# Patient Record
Sex: FEMALE | Race: BLACK OR AFRICAN AMERICAN | Employment: FULL TIME | URBAN - METROPOLITAN AREA
[De-identification: names, ages, dates, MRNs, and addresses within clinical notes are randomized per-mention and may not be internally consistent; named-entity substitution may affect disease eponyms.]

---

## 2017-01-07 ENCOUNTER — OFFICE VISIT (OUTPATIENT)
Dept: FAMILY MEDICINE CLINIC | Facility: CLINIC | Age: 57
End: 2017-01-07

## 2017-01-07 VITALS
WEIGHT: 151 LBS | HEART RATE: 72 BPM | HEIGHT: 63 IN | BODY MASS INDEX: 26.75 KG/M2 | SYSTOLIC BLOOD PRESSURE: 136 MMHG | DIASTOLIC BLOOD PRESSURE: 80 MMHG | TEMPERATURE: 98 F

## 2017-01-07 DIAGNOSIS — I10 ESSENTIAL HYPERTENSION: Primary | ICD-10-CM

## 2017-01-07 PROCEDURE — 99213 OFFICE O/P EST LOW 20 MIN: CPT | Performed by: FAMILY MEDICINE

## 2017-01-07 PROCEDURE — 99212 OFFICE O/P EST SF 10 MIN: CPT | Performed by: FAMILY MEDICINE

## 2017-01-07 NOTE — PROGRESS NOTES
HPI:    Patient ID: Jay Ward is a 55year old female. Blood Pressure  This is a chronic problem. The current episode started more than 1 year ago. The problem has been gradually improving. Associated symptoms include neck pain.  Pertinent negativ 5/2017. Also discussed importance of regular exercise and maintaining healthy diet with weight loss. Patient has recommended regular exercise. No orders of the defined types were placed in this encounter.        Meds This Visit:  No prescriptions reque

## 2017-03-24 ENCOUNTER — TELEPHONE (OUTPATIENT)
Dept: FAMILY MEDICINE CLINIC | Facility: CLINIC | Age: 57
End: 2017-03-24

## 2017-03-24 RX ORDER — VALACYCLOVIR HYDROCHLORIDE 1 G/1
1 TABLET, FILM COATED ORAL DAILY
Qty: 21 TABLET | Refills: 0 | Status: CANCELLED | OUTPATIENT
Start: 2017-03-24

## 2017-03-24 RX ORDER — VALACYCLOVIR HYDROCHLORIDE 1 G/1
2 TABLET, FILM COATED ORAL EVERY 12 HOURS SCHEDULED
Qty: 4 TABLET | Refills: 5 | Status: SHIPPED | OUTPATIENT
Start: 2017-03-24 | End: 2017-03-25

## 2017-03-24 NOTE — TELEPHONE ENCOUNTER
Actions Requested: Possible Rx to pharmacy  Valacyclovir Rx pended for MD approval.  Situation/Background   Problem: fever blister lower lip   Onset: over 24 hours ago   Associated Symptoms: none   History of Same: Pt states that she has Hx of fever bliste

## 2017-03-24 NOTE — TELEPHONE ENCOUNTER
Pt states she has a fever blister, states fever blister is on lip. Pt states normally  prescribes her Valtrex, would like to know if MD can prescribed it again.

## 2017-03-24 NOTE — TELEPHONE ENCOUNTER
Patient was left a detail message on personal voicemail. CSS if she calls back; ok to tell her her RX was sent for Valtrex with refills.

## 2017-07-29 ENCOUNTER — TELEPHONE (OUTPATIENT)
Dept: FAMILY MEDICINE CLINIC | Facility: CLINIC | Age: 57
End: 2017-07-29

## 2017-07-29 NOTE — TELEPHONE ENCOUNTER
Pt. Wants to know if it is ok for her to take [facility] for Menopause symptoms and hot flashes. Pt. States that she purchased it at [facility] [employer]. She states that she has not had a cycle since October 2016, only spotting on and off.  Pt. Wants to make sure

## 2017-07-31 NOTE — TELEPHONE ENCOUNTER
Pt returned call, reviewed doctor's recommendations as noted below. Pt agreed with plan of care and had no further questions at this time.

## 2017-08-01 ENCOUNTER — TELEPHONE (OUTPATIENT)
Dept: FAMILY MEDICINE CLINIC | Facility: CLINIC | Age: 57
End: 2017-08-01

## 2017-08-01 RX ORDER — METOPROLOL TARTRATE 50 MG/1
50 TABLET, FILM COATED ORAL DAILY
Qty: 30 TABLET | Refills: 1 | Status: SHIPPED | OUTPATIENT
Start: 2017-08-01 | End: 2017-09-01 | Stop reason: ALTCHOICE

## 2017-08-01 NOTE — TELEPHONE ENCOUNTER
Actions Requested: pt thinks she is having a side effect from Amlodipine, would like another med prescribed.   Problem: swollen gums  Onset and Timing: started in March of this year  Associated Symptoms: gums are tender to touch and occasionally bleed  Aggr

## 2017-08-01 NOTE — TELEPHONE ENCOUNTER
Advised patient of Dr. Fredo Laboy note. Patient verbalized understanding and is in agreement to start the metoprolol. Rx sent to pharmacy.

## 2017-08-01 NOTE — TELEPHONE ENCOUNTER
Please let patient know I recommend stopping amlodipine and starting metoprolol 50 mg daily. If patient in agreement send Rx for 1 month with 1 refill.   Try to check blood pressures outside of office and bring to appointment scheduled in September

## 2017-08-01 NOTE — TELEPHONE ENCOUNTER
Pt states she is having a side effect where her gums are swollen, sensitive to touch, and bleeding caused by the medication below, Pt would like another medication.  Pt states have already seen a dentist.     Current Outpatient Prescriptions:   •  AmLODIPin

## 2017-08-05 ENCOUNTER — TELEPHONE (OUTPATIENT)
Dept: FAMILY MEDICINE CLINIC | Facility: CLINIC | Age: 57
End: 2017-08-05

## 2017-08-05 NOTE — TELEPHONE ENCOUNTER
Pt states she may be having an allergic reaction to the medication below due to after she started taking the medication she started getting cramps in both feet. Pt would like to know if she should continue medication.     Current Outpatient Prescriptions

## 2017-08-06 ENCOUNTER — TELEPHONE (OUTPATIENT)
Dept: FAMILY MEDICINE CLINIC | Facility: CLINIC | Age: 57
End: 2017-08-06

## 2017-08-06 RX ORDER — METOPROLOL SUCCINATE 25 MG/1
25 TABLET, EXTENDED RELEASE ORAL DAILY
Qty: 30 TABLET | Refills: 1 | Status: SHIPPED | OUTPATIENT
Start: 2017-08-06 | End: 2017-10-01

## 2017-08-06 RX ORDER — LISINOPRIL 40 MG/1
40 TABLET ORAL DAILY
Qty: 30 TABLET | Refills: 1 | Status: SHIPPED | OUTPATIENT
Start: 2017-08-06 | End: 2017-09-01 | Stop reason: ALTCHOICE

## 2017-08-06 NOTE — TELEPHONE ENCOUNTER
On-call page–patient called because blood pressure elevated. She was on amlodipine but discontinued due to gingivitis. Started metoprolol 50 mg daily and had muscle cramps.   Plan to decrease metoprolol XL  To 25 mg daily, increase lisinopril to 40 mg abby

## 2017-08-09 NOTE — TELEPHONE ENCOUNTER
Was addressed by Dr. Kilo Odonnell  at 8/6/17. She stated her blood pressure has been ranging around 137/83. She is asking if the Cohosh that she is taking for her menopause can cause her blood pressure to rise.    The morning cohosh has caffeine and green te

## 2017-08-09 NOTE — TELEPHONE ENCOUNTER
Please let her know I do not think cohash or caffiene(less than 200 mg) is a likely cause of higher BPs.

## 2017-09-01 ENCOUNTER — OFFICE VISIT (OUTPATIENT)
Dept: FAMILY MEDICINE CLINIC | Facility: CLINIC | Age: 57
End: 2017-09-01

## 2017-09-01 VITALS
BODY MASS INDEX: 27.11 KG/M2 | WEIGHT: 153 LBS | TEMPERATURE: 98 F | RESPIRATION RATE: 16 BRPM | HEIGHT: 63 IN | SYSTOLIC BLOOD PRESSURE: 144 MMHG | HEART RATE: 76 BPM | DIASTOLIC BLOOD PRESSURE: 79 MMHG

## 2017-09-01 DIAGNOSIS — F43.9 STRESS: ICD-10-CM

## 2017-09-01 DIAGNOSIS — I10 ESSENTIAL HYPERTENSION: Primary | ICD-10-CM

## 2017-09-01 DIAGNOSIS — N95.1 MENOPAUSAL SYMPTOMS: ICD-10-CM

## 2017-09-01 DIAGNOSIS — H04.123 DRY EYES: ICD-10-CM

## 2017-09-01 PROCEDURE — 99212 OFFICE O/P EST SF 10 MIN: CPT | Performed by: FAMILY MEDICINE

## 2017-09-01 PROCEDURE — 99214 OFFICE O/P EST MOD 30 MIN: CPT | Performed by: FAMILY MEDICINE

## 2017-09-01 RX ORDER — SPIRONOLACTONE 50 MG/1
50 TABLET, FILM COATED ORAL DAILY
Qty: 90 TABLET | Refills: 0 | Status: SHIPPED | OUTPATIENT
Start: 2017-09-01 | End: 2017-11-21

## 2017-09-01 NOTE — PROGRESS NOTES
HPI:    Patient ID: Malcolm Chino is a 55year old female. Hypertension   This is a chronic problem. The current episode started more than 1 year ago. The problem has been gradually worsening since onset. The problem is uncontrolled.  Pertinent negati Blood pressure not controlled. She had to discontinue calcium channel blocker due to gingival side effects. Hypokalemia with hydrochlorothiazide. Reviewed last GFR and potassium, normal.  Plan to discontinue lisinopril, start spironolactone 50 mg daily.

## 2017-09-05 ENCOUNTER — NURSE TRIAGE (OUTPATIENT)
Dept: FAMILY MEDICINE CLINIC | Facility: CLINIC | Age: 57
End: 2017-09-05

## 2017-09-05 RX ORDER — LORAZEPAM 0.5 MG/1
0.5 TABLET ORAL EVERY 6 HOURS PRN
Qty: 20 TABLET | Refills: 0 | Status: SHIPPED | OUTPATIENT
Start: 2017-09-05 | End: 2017-10-07 | Stop reason: ALTCHOICE

## 2017-09-05 NOTE — TELEPHONE ENCOUNTER
Action Requested: Summary for Provider     []  Critical Lab, Recommendations Needed  [] Need Additional Advice  []   FYI    []   Need Orders  [x] Need Medications Sent to Pharmacy  []  Other     SUMMARY: WOULD LIKE MED TO HELP WITH ANXIETY  Pt saw Dr Rob Ervin

## 2017-09-05 NOTE — TELEPHONE ENCOUNTER
Please let her know I am sorry to hear about her sister. I have sent prescription to the pharmacy for lorazepam every 6 hours as needed for anxiety. But it can cause sedation, do not drive, do not drink alcohol with this.   This to use mostly at night to

## 2017-09-05 NOTE — TELEPHONE ENCOUNTER
Spoke with patient (name and  verified), reviewed information, patient verbalized understanding and agrees with plan.   [facility]

## 2017-09-23 ENCOUNTER — NURSE TRIAGE (OUTPATIENT)
Dept: OTHER | Age: 57
End: 2017-09-23

## 2017-09-23 NOTE — TELEPHONE ENCOUNTER
Patient indicated that started taking the spironolactone in the morning that Dr Astrid Arnold prescribed. Patient does not eat breakfast. Since started taking the medication has been dizzy and nauseated, but goes away after eating.  Patient stated that will try ta

## 2017-09-25 NOTE — TELEPHONE ENCOUNTER
FU Call: Pt stated tolerating spironolactone better after her meal. Pt has f/u appt 10/7/17 @ 10:45am with  [facility]. FYI:  [facility].

## 2017-09-26 ENCOUNTER — TELEPHONE (OUTPATIENT)
Dept: OTHER | Age: 57
End: 2017-09-26

## 2017-09-26 NOTE — TELEPHONE ENCOUNTER
TO DR NEVILLE;      Patient called and states that the medication Spironolactone is working good for her BP but she's nauseated when taking it on empty stomach and experiencing headache when taking on full stomach.  Patient is asking if the dose is too much for

## 2017-09-26 NOTE — TELEPHONE ENCOUNTER
Probably not but will discuss at follow-up. please let her know I recommend continuing to take blood pressure medication daily with small amount of food in the evening about 1 hour before bed. If headache okay to take Tylenol.   Bring record of home blood

## 2017-09-27 NOTE — TELEPHONE ENCOUNTER
Patient called and advised about Dr Kurt Stuart note. Patient verbalized understanding. Note      Probably not but will discuss at follow-up.  please let her know I recommend continuing to take blood pressure medication daily with small amount of food in th

## 2017-09-27 NOTE — TELEPHONE ENCOUNTER
LMTCB, please transfer to Triage RN, please don't take a message, but send her through directly to RN. Thank you.

## 2017-10-03 ENCOUNTER — TELEPHONE (OUTPATIENT)
Dept: FAMILY MEDICINE CLINIC | Facility: CLINIC | Age: 57
End: 2017-10-03

## 2017-10-03 RX ORDER — METOPROLOL SUCCINATE 25 MG/1
25 TABLET, EXTENDED RELEASE ORAL DAILY
Qty: 30 TABLET | Refills: 0 | Status: SHIPPED | OUTPATIENT
Start: 2017-10-03 | End: 2017-10-04

## 2017-10-03 NOTE — TELEPHONE ENCOUNTER
Per [facility], pt's insurance requires a 90 day supply of the Metoprolol Succ ER 25mg tab. Pls send in a new Rx for 90 days.

## 2017-10-04 RX ORDER — METOPROLOL SUCCINATE 25 MG/1
25 TABLET, EXTENDED RELEASE ORAL DAILY
Qty: 90 TABLET | Refills: 0 | Status: SHIPPED | OUTPATIENT
Start: 2017-10-04 | End: 2017-12-31

## 2017-10-04 NOTE — TELEPHONE ENCOUNTER
Pt has a f/u appt on 10/7  Approved per protocol. Thanks    Hypertensive Medications  Protocol Criteria:  · Appointment scheduled in the past 6 months or in the next 3 months  · BMP or CMP in the past 12 months  · Creatinine result < 2  Recent Outpatient

## 2017-10-07 ENCOUNTER — APPOINTMENT (OUTPATIENT)
Dept: LAB | Age: 57
End: 2017-10-07
Attending: FAMILY MEDICINE
Payer: COMMERCIAL

## 2017-10-07 ENCOUNTER — OFFICE VISIT (OUTPATIENT)
Dept: FAMILY MEDICINE CLINIC | Facility: CLINIC | Age: 57
End: 2017-10-07

## 2017-10-07 VITALS
DIASTOLIC BLOOD PRESSURE: 90 MMHG | WEIGHT: 149.81 LBS | HEART RATE: 114 BPM | BODY MASS INDEX: 26.54 KG/M2 | HEIGHT: 63 IN | TEMPERATURE: 99 F | SYSTOLIC BLOOD PRESSURE: 144 MMHG | RESPIRATION RATE: 17 BRPM

## 2017-10-07 DIAGNOSIS — Z72.89 OTHER PROBLEMS RELATED TO LIFESTYLE: ICD-10-CM

## 2017-10-07 DIAGNOSIS — I10 ESSENTIAL HYPERTENSION: Primary | ICD-10-CM

## 2017-10-07 DIAGNOSIS — I10 ESSENTIAL HYPERTENSION: ICD-10-CM

## 2017-10-07 DIAGNOSIS — F41.1 GAD (GENERALIZED ANXIETY DISORDER): ICD-10-CM

## 2017-10-07 PROCEDURE — 86803 HEPATITIS C AB TEST: CPT

## 2017-10-07 PROCEDURE — 90686 IIV4 VACC NO PRSV 0.5 ML IM: CPT | Performed by: FAMILY MEDICINE

## 2017-10-07 PROCEDURE — 85027 COMPLETE CBC AUTOMATED: CPT

## 2017-10-07 PROCEDURE — 99214 OFFICE O/P EST MOD 30 MIN: CPT | Performed by: FAMILY MEDICINE

## 2017-10-07 PROCEDURE — 84443 ASSAY THYROID STIM HORMONE: CPT

## 2017-10-07 PROCEDURE — 80048 BASIC METABOLIC PNL TOTAL CA: CPT

## 2017-10-07 PROCEDURE — 90471 IMMUNIZATION ADMIN: CPT | Performed by: FAMILY MEDICINE

## 2017-10-07 PROCEDURE — 80061 LIPID PANEL: CPT

## 2017-10-07 PROCEDURE — 36415 COLL VENOUS BLD VENIPUNCTURE: CPT

## 2017-10-07 PROCEDURE — 99212 OFFICE O/P EST SF 10 MIN: CPT | Performed by: FAMILY MEDICINE

## 2017-10-07 RX ORDER — ESCITALOPRAM OXALATE 5 MG/1
2.5 TABLET ORAL DAILY
Qty: 60 TABLET | Refills: 2 | Status: SHIPPED | OUTPATIENT
Start: 2017-10-07 | End: 2018-03-26

## 2017-10-07 NOTE — PROGRESS NOTES
HPI:    Patient ID: Lorenza Mcghee is a 55year old female. Hypertension   This is a chronic problem. The current episode started more than 1 year ago. The problem has been waxing and waning since onset. Associated symptoms include anxiety.  Pertinent is warm and dry. ASSESSMENT/PLAN:   Essential hypertension  (primary encounter diagnosis)  Narendra (generalized anxiety disorder)  Other problems related to lifestyle     Hypertension– 142/78 today but at home averaging 120/70.   Taking metoprolol

## 2017-10-27 RX ORDER — LISINOPRIL 20 MG/1
TABLET ORAL
Qty: 90 TABLET | Refills: 3 | Status: SHIPPED | OUTPATIENT
Start: 2017-10-27 | End: 2017-11-19

## 2017-11-19 ENCOUNTER — HOSPITAL ENCOUNTER (OUTPATIENT)
Age: 57
Discharge: HOME | End: 2017-11-19
Attending: FAMILY MEDICINE
Payer: COMMERCIAL

## 2017-11-19 VITALS
BODY MASS INDEX: 26.58 KG/M2 | HEART RATE: 95 BPM | DIASTOLIC BLOOD PRESSURE: 79 MMHG | RESPIRATION RATE: 18 BRPM | OXYGEN SATURATION: 100 % | HEIGHT: 63 IN | TEMPERATURE: 98 F | WEIGHT: 150 LBS | SYSTOLIC BLOOD PRESSURE: 169 MMHG

## 2017-11-19 DIAGNOSIS — L08.9 FINGER INFECTION: Primary | ICD-10-CM

## 2017-11-19 PROCEDURE — 99213 OFFICE O/P EST LOW 20 MIN: CPT

## 2017-11-19 PROCEDURE — 99202 OFFICE O/P NEW SF 15 MIN: CPT

## 2017-11-19 RX ORDER — CEPHALEXIN 250 MG/5ML
250 POWDER, FOR SUSPENSION ORAL 4 TIMES DAILY
Qty: 200 ML | Refills: 0 | Status: SHIPPED | OUTPATIENT
Start: 2017-11-19 | End: 2017-11-29

## 2017-11-19 RX ORDER — METOPROLOL SUCCINATE 25 MG/1
25 TABLET, EXTENDED RELEASE ORAL DAILY
COMMUNITY
End: 2017-12-01

## 2017-11-19 NOTE — ED NOTES
Leaving IC stable no acute distress noted RX given and explained pt verbalizes DC and follow up instructions.

## 2017-11-19 NOTE — ED PROVIDER NOTES
Patient Seen in: [address]    History   Patient presents with:  Finger Pain    Stated Complaint: Possible thumb infection    HPI   Renetta Sevilla poked her R thumb one week ago with a  and now has thumb pain.   Also linsey digits. Mild erythema lateral thumb at cuticle suggestive of paronychium. No tenderness or nodes at elbow   Skin: Skin is warm and dry. No rash noted. She is not diaphoretic. No erythema. No pallor. Nursing note and vitals reviewed.           ED Course

## 2017-11-19 NOTE — ED INITIAL ASSESSMENT (HPI)
Per pt having pain and swelling to right thumb. Pt states poked finger one week ago on . Pt states on Thursday began with pain redness and swelling to finger.

## 2017-11-21 ENCOUNTER — TELEPHONE (OUTPATIENT)
Dept: OTHER | Age: 57
End: 2017-11-21

## 2017-11-21 ENCOUNTER — OFFICE VISIT (OUTPATIENT)
Dept: FAMILY MEDICINE CLINIC | Facility: CLINIC | Age: 57
End: 2017-11-21

## 2017-11-21 VITALS
DIASTOLIC BLOOD PRESSURE: 79 MMHG | HEART RATE: 67 BPM | WEIGHT: 152.81 LBS | TEMPERATURE: 98 F | SYSTOLIC BLOOD PRESSURE: 118 MMHG | HEIGHT: 63 IN | BODY MASS INDEX: 27.07 KG/M2 | RESPIRATION RATE: 17 BRPM

## 2017-11-21 DIAGNOSIS — L03.011 PARONYCHIA OF FINGER OF RIGHT HAND: Primary | ICD-10-CM

## 2017-11-21 PROCEDURE — 99213 OFFICE O/P EST LOW 20 MIN: CPT | Performed by: FAMILY MEDICINE

## 2017-11-21 PROCEDURE — 10060 I&D ABSCESS SIMPLE/SINGLE: CPT | Performed by: FAMILY MEDICINE

## 2017-11-21 PROCEDURE — 99212 OFFICE O/P EST SF 10 MIN: CPT | Performed by: FAMILY MEDICINE

## 2017-11-21 RX ORDER — SPIRONOLACTONE 50 MG/1
50 TABLET, FILM COATED ORAL DAILY
Qty: 90 TABLET | Refills: 0 | Status: SHIPPED | OUTPATIENT
Start: 2017-11-21 | End: 2018-02-15

## 2017-11-21 RX ORDER — CLINDAMYCIN HYDROCHLORIDE 300 MG/1
300 CAPSULE ORAL 3 TIMES DAILY
Qty: 21 CAPSULE | Refills: 0 | Status: SHIPPED | OUTPATIENT
Start: 2017-11-21 | End: 2017-11-28

## 2017-11-21 NOTE — TELEPHONE ENCOUNTER
Advised patient of Dr. Serena Garibay note. Patient verbalized understanding and calling [facility] now, indicated that will be there.

## 2017-11-21 NOTE — TELEPHONE ENCOUNTER
Went to  Sunday for her thumb cuticle that turned out to be infected. [facility] was given antibiotics for right thumb. [facility] had spoken to someone from [address] [place] [address] yesterday and was told to do warm soaks and use ibuprofen for pain, and f/u with [facility].  [facility] had spok

## 2017-11-22 ENCOUNTER — TELEPHONE (OUTPATIENT)
Dept: FAMILY MEDICINE CLINIC | Facility: CLINIC | Age: 57
End: 2017-11-22

## 2017-11-22 NOTE — PROGRESS NOTES
HPI:    Patient ID: Larry Masterson is a 55year old female. Finger Pain    The pain is present in the right fingers. This is a new problem. The current episode started in the past 7 days. The problem has been rapidly worsening.  The quality of the pain capsule (300 mg total) by mouth 3 (three) times daily. spironolactone 50 MG Oral Tab 90 tablet 0      Sig: Take 1 tablet (50 mg total) by mouth daily.            Imaging & Referrals:  None       DY#[id number]

## 2017-11-22 NOTE — TELEPHONE ENCOUNTER
Pt called in requesting a doctors note to excuse her from work for 11/20-11/22 due to her thumb. Pt is requesting the note to state that she can return on 11/27. Pt is requesting a confirmation call when the note is available for pickup.

## 2017-11-22 NOTE — PROCEDURES
Written informed consent obtained. Aseptic technique. Digital block 1 cc bilateral 2% lidocaine . Incision and drainage paronychia right thumb, approximately 5 cc of purulent drainage. Sterile dressing. Procedure tolerated well.  Wound instructions given

## 2017-11-25 ENCOUNTER — TELEPHONE (OUTPATIENT)
Dept: OTHER | Age: 57
End: 2017-11-25

## 2017-11-25 NOTE — TELEPHONE ENCOUNTER
Pt contacted (Name and  verified) and provider result relayed to pt. Pt verbalizes understanding and states that her thumb pain is gone and the inflammation is improving. She states that the skin is thickened but appears to be healing.     Pt instruct

## 2017-11-25 NOTE — TELEPHONE ENCOUNTER
When speaking with pt regarding her results, pt inquired about note for work still pending. Pt states that she will  the note when ready. Please have Clinical Site staff contact pt when ready for .

## 2017-11-25 NOTE — TELEPHONE ENCOUNTER
----- Message from Thomas Carpenter MD sent at 11/24/2017  3:24 PM CST -----  Please let patient know culture grew mixture of bacteria. Should be sensitive to antibiotic given. Let us know if not improving.

## 2017-12-01 ENCOUNTER — TELEPHONE (OUTPATIENT)
Dept: FAMILY MEDICINE CLINIC | Facility: CLINIC | Age: 57
End: 2017-12-01

## 2017-12-01 RX ORDER — METOPROLOL SUCCINATE 25 MG/1
25 TABLET, EXTENDED RELEASE ORAL DAILY
Qty: 90 TABLET | Refills: 0 | Status: SHIPPED | OUTPATIENT
Start: 2017-12-01 | End: 2018-03-30

## 2017-12-01 NOTE — TELEPHONE ENCOUNTER
Hypertensive Medications: Refilled per protocol    Protocol Criteria:  · Appointment scheduled in the past 6 months or in the next 3 months  · BMP or CMP in the past 12 months  · Creatinine result < 2  Recent Outpatient Visits            1 week ago [facility]

## 2017-12-29 ENCOUNTER — TELEPHONE (OUTPATIENT)
Dept: OTHER | Age: 57
End: 2017-12-29

## 2017-12-29 DIAGNOSIS — H10.10 ALLERGIC CONJUNCTIVITIS, UNSPECIFIED LATERALITY: ICD-10-CM

## 2017-12-29 RX ORDER — FLUTICASONE PROPIONATE 50 MCG
2 SPRAY, SUSPENSION (ML) NASAL DAILY
Qty: 1 BOTTLE | Refills: 5 | Status: SHIPPED | OUTPATIENT
Start: 2017-12-29 | End: 2018-08-26

## 2017-12-29 NOTE — TELEPHONE ENCOUNTER
Reviewed doctor's recommendations with pt, pt agreed with plan of care.  Pt is now asking if she should take Pepto Bismol for the diarrhea

## 2017-12-29 NOTE — TELEPHONE ENCOUNTER
Pt states she was in to see Dr Ena Anaya a month ago r/t infected cuticle. Pt was on two antibiotics and per pt Dr Ena Anaya recommended to take Activia to prevent GI issues r/t antibiotics.   Pt reports she has been taking the Activia for one month and has had

## 2017-12-29 NOTE — TELEPHONE ENCOUNTER
Recommend continuing activity. Push fluids, caffeine free, otherwise dairy free diet.   If diarrhea continuing next week or if blood in stool, fever be sure to make appointment

## 2017-12-29 NOTE — TELEPHONE ENCOUNTER
Called patient - verified patient's name and  - informed pt of doctor's note - patient verbalized understanding

## 2017-12-29 NOTE — TELEPHONE ENCOUNTER
Yes okay to take Pepto-Bismol 4 times a day as needed warn patient that it may turn tongue and stools black

## 2017-12-31 RX ORDER — METOPROLOL SUCCINATE 25 MG/1
25 TABLET, EXTENDED RELEASE ORAL DAILY
Qty: 90 TABLET | Refills: 3 | Status: SHIPPED | OUTPATIENT
Start: 2017-12-31 | End: 2018-01-30

## 2018-01-30 ENCOUNTER — TELEPHONE (OUTPATIENT)
Dept: FAMILY MEDICINE CLINIC | Facility: CLINIC | Age: 58
End: 2018-01-30

## 2018-01-30 DIAGNOSIS — Z12.31 ENCOUNTER FOR SCREENING MAMMOGRAM FOR BREAST CANCER: Primary | ICD-10-CM

## 2018-01-30 NOTE — TELEPHONE ENCOUNTER
Pt calling to request an order for a mammogram.  Pt would like order to go to the [facility] center.   Please advise

## 2018-02-16 RX ORDER — SPIRONOLACTONE 50 MG/1
50 TABLET, FILM COATED ORAL DAILY
Qty: 90 TABLET | Refills: 0 | Status: SHIPPED | OUTPATIENT
Start: 2018-02-16 | End: 2018-05-21

## 2018-02-16 NOTE — TELEPHONE ENCOUNTER
Rx approved for 90 days per protocol.       Hypertensive Medications  Protocol Criteria:  · Appointment scheduled in the past 6 months or in the next 3 months  · BMP or CMP in the past 12 months  · Creatinine result < 2  Recent Outpatient Visits

## 2018-03-13 ENCOUNTER — OFFICE VISIT (OUTPATIENT)
Dept: FAMILY MEDICINE CLINIC | Facility: CLINIC | Age: 58
End: 2018-03-13

## 2018-03-13 VITALS
RESPIRATION RATE: 16 BRPM | WEIGHT: 169.38 LBS | HEART RATE: 67 BPM | TEMPERATURE: 98 F | SYSTOLIC BLOOD PRESSURE: 124 MMHG | HEIGHT: 63 IN | DIASTOLIC BLOOD PRESSURE: 78 MMHG | BODY MASS INDEX: 30.01 KG/M2

## 2018-03-13 DIAGNOSIS — N95.0 POSTMENOPAUSAL BLEEDING: Primary | ICD-10-CM

## 2018-03-13 PROCEDURE — 99214 OFFICE O/P EST MOD 30 MIN: CPT | Performed by: FAMILY MEDICINE

## 2018-03-13 PROCEDURE — 99212 OFFICE O/P EST SF 10 MIN: CPT | Performed by: FAMILY MEDICINE

## 2018-03-14 NOTE — PROGRESS NOTES
HPI:    Patient ID: Sherman Traylor is a 65year old female. Menstrual Problem   The patient's primary symptoms include vaginal bleeding. This is a recurrent problem. The current episode started 1 to 4 weeks ago. The problem has been waxing and waning. encounter diagnosis)     Patient had menstrual period 10/2016, few episodes of light spotting last year then heavy spotting last week. Hot flashes have improved. She has history of fibroids status post uterine artery ablation.   Recommend pelvic ultrasoun

## 2018-03-17 ENCOUNTER — HOSPITAL ENCOUNTER (OUTPATIENT)
Dept: ULTRASOUND IMAGING | Facility: HOSPITAL | Age: 58
Discharge: HOME | End: 2018-03-17
Attending: FAMILY MEDICINE
Payer: COMMERCIAL

## 2018-03-17 DIAGNOSIS — N95.0 POSTMENOPAUSAL BLEEDING: ICD-10-CM

## 2018-03-17 PROCEDURE — 76830 TRANSVAGINAL US NON-OB: CPT | Performed by: FAMILY MEDICINE

## 2018-03-17 PROCEDURE — 76856 US EXAM PELVIC COMPLETE: CPT | Performed by: FAMILY MEDICINE

## 2018-03-19 ENCOUNTER — TELEPHONE (OUTPATIENT)
Dept: FAMILY MEDICINE CLINIC | Facility: CLINIC | Age: 58
End: 2018-03-19

## 2018-03-19 DIAGNOSIS — N95.0 POSTMENOPAUSAL BLEEDING: Primary | ICD-10-CM

## 2018-03-19 NOTE — TELEPHONE ENCOUNTER
Pt stts she returning missed from office.    Pt is not sure if call is regarding US results from 3/17

## 2018-03-19 NOTE — TELEPHONE ENCOUNTER
LMTCB. Transfer to triage. Notes recorded by Nanda Joseph MD on 3/19/2018 at 4:36 PM CDT  Nurses–please check patient received my detailed message, and whether questions.  Recommend OB GYN evaluation with Dr. Kenneth Olivas or Dr. Sylvain Varela.     Study Resu presence of fibroids, endometrial hyperplasia, or other inflammatory process. Recommend correlation with   biopsy given history of postmenopausal bleeding. 2.  Intramural fibroids are present. 3.  Normal ultrasound appearance of the ovaries.

## 2018-03-20 NOTE — TELEPHONE ENCOUNTER
Pt called back, she was able to listen to Dr. Mallory Tee message and has the contact info of both Dr. Hugo Persaud and Dr. Rad Oscar. She will call tomorrow to schedule appt. She does not have any additional questions at this time.

## 2018-03-26 ENCOUNTER — OFFICE VISIT (OUTPATIENT)
Dept: OBGYN CLINIC | Facility: CLINIC | Age: 58
End: 2018-03-26

## 2018-03-26 ENCOUNTER — APPOINTMENT (OUTPATIENT)
Dept: LAB | Age: 58
End: 2018-03-26
Attending: OBSTETRICS & GYNECOLOGY
Payer: COMMERCIAL

## 2018-03-26 VITALS — DIASTOLIC BLOOD PRESSURE: 70 MMHG | BODY MASS INDEX: 30 KG/M2 | SYSTOLIC BLOOD PRESSURE: 158 MMHG | WEIGHT: 167.38 LBS

## 2018-03-26 DIAGNOSIS — N91.2 AMENORRHEA: Primary | ICD-10-CM

## 2018-03-26 DIAGNOSIS — N91.2 AMENORRHEA: ICD-10-CM

## 2018-03-26 LAB
FSH SERPL-ACNC: 85.7 MIU/ML
LH SERPL-ACNC: 43.4 MIU/ML

## 2018-03-26 PROCEDURE — 83002 ASSAY OF GONADOTROPIN (LH): CPT

## 2018-03-26 PROCEDURE — 99204 OFFICE O/P NEW MOD 45 MIN: CPT | Performed by: OBSTETRICS & GYNECOLOGY

## 2018-03-26 PROCEDURE — 83001 ASSAY OF GONADOTROPIN (FSH): CPT

## 2018-03-26 PROCEDURE — 36415 COLL VENOUS BLD VENIPUNCTURE: CPT

## 2018-03-26 NOTE — PROGRESS NOTES
HPI:    Patient ID: Cassy Santamaria is a 65year old female. HPI  Patient referred by PCP for borderline endometrial lining on U/S. Has fibroids that are about 2 cm each x 3. Patient had spotting in March but not sure if patient is in menopause.   Dis Hormone)    Meds This Visit:  No prescriptions requested or ordered in this encounter    Imaging & Referrals:  None       #[id number]

## 2018-03-27 ENCOUNTER — TELEPHONE (OUTPATIENT)
Dept: OBGYN CLINIC | Facility: CLINIC | Age: 58
End: 2018-03-27

## 2018-03-27 NOTE — TELEPHONE ENCOUNTER
----- Message from Karine Cam MD sent at 3/27/2018  6:35 AM CDT -----  [facility] is [facility]. The patient is in menopause. Patient should schedule an EMBx for Postmenopausal bleeding. Call patient.

## 2018-03-28 ENCOUNTER — NURSE TRIAGE (OUTPATIENT)
Dept: OTHER | Age: 58
End: 2018-03-28

## 2018-03-29 NOTE — TELEPHONE ENCOUNTER
Pt called on cell phone. Pt informed of mlm message below. Pt verbalizes understanding. Pt wants to know if there is something Dr. Patience Mckenna can give her to help her relax for the EmBx? Pt voices when she had her EmBx years ago, she was given twilight.  Eleno

## 2018-03-29 NOTE — TELEPHONE ENCOUNTER
We do not have Twilight in the office. She can make an appointment for the EMBx and then she can come to the office a day or two prior to  a prescription for a valium tablet. She should take it 30 minutes prior to the biopsy.   Of Course she will n

## 2018-03-30 RX ORDER — METOPROLOL SUCCINATE 25 MG/1
25 TABLET, EXTENDED RELEASE ORAL DAILY
Qty: 90 TABLET | Refills: 0 | Status: SHIPPED | OUTPATIENT
Start: 2018-03-30 | End: 2018-06-27

## 2018-03-30 NOTE — TELEPHONE ENCOUNTER
Message left on pt's voicemail of Dr. Valdovinos Comes message below, and that if the pt wants Valium for her procedure, to call our office back at .

## 2018-03-30 NOTE — TELEPHONE ENCOUNTER
The pt is returning a nurse's call, and states that a detailed v/m can be left at [phone]. Please advise.

## 2018-04-02 NOTE — TELEPHONE ENCOUNTER
Pt would like to get Rx for valium and states she just needs to know when she can come to office to pick it up.  Pls advise

## 2018-04-02 NOTE — TELEPHONE ENCOUNTER
I will be in the office tomorrow and can write the prescription then. I will leave it with the front dest and the patient can pick it up at her convenience.

## 2018-04-03 ENCOUNTER — TELEPHONE (OUTPATIENT)
Dept: OBGYN CLINIC | Facility: CLINIC | Age: 58
End: 2018-04-03

## 2018-04-03 RX ORDER — DIAZEPAM 5 MG/1
5 TABLET ORAL ONCE
Qty: 1 TABLET | Refills: 0 | Status: SHIPPED | OUTPATIENT
Start: 2018-04-03 | End: 2018-04-03

## 2018-04-06 ENCOUNTER — TELEPHONE (OUTPATIENT)
Dept: PEDIATRICS CLINIC | Facility: CLINIC | Age: 58
End: 2018-04-06

## 2018-04-06 NOTE — TELEPHONE ENCOUNTER
The pt is returning a nurse's call. The pt states that she is on lunch for the next 20 minutes, and can't get calls after that. Please advise.

## 2018-04-06 NOTE — TELEPHONE ENCOUNTER
Per pt she cannot  Rx at [place] location. States she needs to pick it up at [facility] location and then have procedure done. Moved her procedure to a later apt so pt would have time to get medication and be able to take before the embx.

## 2018-04-09 ENCOUNTER — OFFICE VISIT (OUTPATIENT)
Dept: OBGYN CLINIC | Facility: CLINIC | Age: 58
End: 2018-04-09

## 2018-04-09 VITALS — SYSTOLIC BLOOD PRESSURE: 130 MMHG | DIASTOLIC BLOOD PRESSURE: 70 MMHG | WEIGHT: 170.38 LBS | BODY MASS INDEX: 30 KG/M2

## 2018-04-09 DIAGNOSIS — N95.0 POSTMENOPAUSAL BLEEDING: Primary | ICD-10-CM

## 2018-04-09 PROCEDURE — 58100 BIOPSY OF UTERUS LINING: CPT | Performed by: OBSTETRICS & GYNECOLOGY

## 2018-04-09 NOTE — PROCEDURES
Endometrial Biopsy    Pre-Procedure Care:   Consent was obtained. Procedure/risks were explained. Questions were answered. Correct patient was identified. Correct side and site were confirmed. Pre-Medications:     The patient was premedicated with

## 2018-04-11 ENCOUNTER — TELEPHONE (OUTPATIENT)
Dept: OBGYN CLINIC | Facility: CLINIC | Age: 58
End: 2018-04-11

## 2018-04-11 NOTE — TELEPHONE ENCOUNTER
----- Message from Heike Rao MD sent at 4/10/2018  3:55 PM CDT -----  Not able to contact patient by phone. Please try and contact patient and inform her that her EMBx is normal.  No further work up needed.

## 2018-04-12 ENCOUNTER — TELEPHONE (OUTPATIENT)
Dept: OBGYN CLINIC | Facility: CLINIC | Age: 58
End: 2018-04-12

## 2018-04-12 NOTE — TELEPHONE ENCOUNTER
Per the pt she had a biopsy done on Monday, and she would like to know how long she will be have a brown discharge. The pt would also like to know when she can take a bath again. Please advise.

## 2018-04-13 NOTE — TELEPHONE ENCOUNTER
Called home phone number and phone rang a few times and then stopped. Called mobile number but voicemail is full.

## 2018-04-14 NOTE — TELEPHONE ENCOUNTER
Pt informed Embx normal. Pt voices understanding and voices mlm already talked to her regarding results.

## 2018-04-21 ENCOUNTER — OFFICE VISIT (OUTPATIENT)
Dept: FAMILY MEDICINE CLINIC | Facility: CLINIC | Age: 58
End: 2018-04-21

## 2018-04-21 VITALS
TEMPERATURE: 98 F | SYSTOLIC BLOOD PRESSURE: 118 MMHG | BODY MASS INDEX: 30.05 KG/M2 | WEIGHT: 169.63 LBS | HEIGHT: 63 IN | RESPIRATION RATE: 14 BRPM | HEART RATE: 60 BPM | DIASTOLIC BLOOD PRESSURE: 77 MMHG

## 2018-04-21 DIAGNOSIS — I10 HYPERTENSION, BENIGN: ICD-10-CM

## 2018-04-21 DIAGNOSIS — N95.0 POSTMENOPAUSAL BLEEDING: ICD-10-CM

## 2018-04-21 DIAGNOSIS — Z00.00 ROUTINE PHYSICAL EXAMINATION: Primary | ICD-10-CM

## 2018-04-21 DIAGNOSIS — Z12.31 ENCOUNTER FOR SCREENING MAMMOGRAM FOR BREAST CANCER: ICD-10-CM

## 2018-04-21 PROCEDURE — 90750 HZV VACC RECOMBINANT IM: CPT | Performed by: FAMILY MEDICINE

## 2018-04-21 PROCEDURE — 99396 PREV VISIT EST AGE 40-64: CPT | Performed by: FAMILY MEDICINE

## 2018-04-21 PROCEDURE — 90471 IMMUNIZATION ADMIN: CPT | Performed by: FAMILY MEDICINE

## 2018-04-21 NOTE — PROGRESS NOTES
HPI:    Patient ID: James Perez is a 65year old female. HPI    Review of Systems   Constitutional: Negative. Respiratory: Negative. Cardiovascular: Negative. Gastrointestinal: Negative. Skin: Negative. Neurological: Negative. normal.  Colonoscopy normal 2013. Hypertension–blood pressure controlled on metoprolol and spironolactone.     Postmenopausal bleeding–as above      Orders Placed This Encounter      Zoster Recombinant Adjuvanted [Shingrix -Shingles] (24477)    Meds This

## 2018-04-24 ENCOUNTER — TELEPHONE (OUTPATIENT)
Dept: FAMILY MEDICINE CLINIC | Facility: CLINIC | Age: 58
End: 2018-04-24

## 2018-04-24 NOTE — TELEPHONE ENCOUNTER
Pt called in stating that she had some sx after her shingles vaccine which have improved since this weekend. Pt wanted it to be noted that she did experience diarrhea, fatigue, muscle pain, and a headache. She states that she did take ibuprofen.  She wants

## 2018-04-24 NOTE — TELEPHONE ENCOUNTER
Please let her know I have noted the side effects. I would recommend receiving a second vaccine because when shot is not likely to be effective, and symptoms and complications from shingles would likely be worse.   But I understand if she decides otherwise

## 2018-04-25 ENCOUNTER — TELEPHONE (OUTPATIENT)
Dept: FAMILY MEDICINE CLINIC | Facility: CLINIC | Age: 58
End: 2018-04-25

## 2018-04-25 NOTE — TELEPHONE ENCOUNTER
Pt informed. Verbalized good understanding of all with intent to comply. Pt will f/u in office for next vaccine. To call office if s/s worsen or fail to improve as anticipated. Voiced understanding.

## 2018-05-21 ENCOUNTER — TELEPHONE (OUTPATIENT)
Dept: PEDIATRICS CLINIC | Facility: CLINIC | Age: 58
End: 2018-05-21

## 2018-05-21 ENCOUNTER — TELEPHONE (OUTPATIENT)
Dept: OBGYN CLINIC | Facility: CLINIC | Age: 58
End: 2018-05-21

## 2018-05-21 RX ORDER — SPIRONOLACTONE 50 MG/1
50 TABLET, FILM COATED ORAL DAILY
Qty: 90 TABLET | Refills: 3 | Status: SHIPPED | OUTPATIENT
Start: 2018-05-21 | End: 2019-05-16

## 2018-05-22 ENCOUNTER — OFFICE VISIT (OUTPATIENT)
Dept: FAMILY MEDICINE CLINIC | Facility: CLINIC | Age: 58
End: 2018-05-22

## 2018-05-22 VITALS
BODY MASS INDEX: 29.41 KG/M2 | WEIGHT: 166 LBS | DIASTOLIC BLOOD PRESSURE: 84 MMHG | HEIGHT: 63 IN | SYSTOLIC BLOOD PRESSURE: 142 MMHG | TEMPERATURE: 98 F | RESPIRATION RATE: 16 BRPM | HEART RATE: 101 BPM

## 2018-05-22 DIAGNOSIS — N90.7 EPIDERMOID CYST OF LABIA MAJORA: Primary | ICD-10-CM

## 2018-05-22 PROCEDURE — 99212 OFFICE O/P EST SF 10 MIN: CPT | Performed by: FAMILY MEDICINE

## 2018-05-22 PROCEDURE — 99213 OFFICE O/P EST LOW 20 MIN: CPT | Performed by: FAMILY MEDICINE

## 2018-06-27 RX ORDER — METOPROLOL SUCCINATE 25 MG/1
25 TABLET, EXTENDED RELEASE ORAL DAILY
Qty: 90 TABLET | Refills: 0 | Status: SHIPPED | OUTPATIENT
Start: 2018-06-27 | End: 2018-09-24

## 2018-06-28 NOTE — TELEPHONE ENCOUNTER
Hypertensive Medications  Protocol Criteria:  · Appointment scheduled in the past 6 months or in the next 3 months  · BMP or CMP in the past 12 months  · Creatinine result < 2  Recent Outpatient Visits            1 month ago Epidermoid cyst of labia majora

## 2018-07-05 ENCOUNTER — TELEPHONE (OUTPATIENT)
Dept: FAMILY MEDICINE CLINIC | Facility: CLINIC | Age: 58
End: 2018-07-05

## 2018-07-05 NOTE — TELEPHONE ENCOUNTER
Pt is still waiting for her 2nd dosage of shingles shot which was 2 months ago  Pt would like to know how long should She wait(vaccines on back order) in order for the 1st dosage to be be effective   Pt is aware shingles 2nd dosage on back order  Please ca

## 2018-07-09 NOTE — TELEPHONE ENCOUNTER
Please let her know second dose is 2-6 months after the first dose. This means she could wait until 10/21/18. I hope we have it by that. Since she does not have an HMO, she could also get it at the pharmacy and they would bill her insurance.

## 2018-07-09 NOTE — TELEPHONE ENCOUNTER
Dr. Blaine Bush,  We are still on back order. Is there any other place (pharmacy) that patient can receive the vaccine?

## 2018-07-28 ENCOUNTER — NURSE ONLY (OUTPATIENT)
Dept: FAMILY MEDICINE CLINIC | Facility: CLINIC | Age: 58
End: 2018-07-28
Payer: COMMERCIAL

## 2018-07-28 DIAGNOSIS — Z23 IMMUNIZATION DUE: Primary | ICD-10-CM

## 2018-07-28 PROCEDURE — 90750 HZV VACC RECOMBINANT IM: CPT | Performed by: FAMILY MEDICINE

## 2018-07-28 PROCEDURE — 90471 IMMUNIZATION ADMIN: CPT | Performed by: FAMILY MEDICINE

## 2018-08-26 DIAGNOSIS — H10.10 ALLERGIC CONJUNCTIVITIS, UNSPECIFIED LATERALITY: ICD-10-CM

## 2018-08-27 NOTE — TELEPHONE ENCOUNTER
Please advise on refill request.     Refill Protocol Appointment Criteria  · Appointment scheduled in the past 12 months or in the next 3 months  Recent Outpatient Visits            1 month ago Immunization due    [facility], [facility], [address], [facility]

## 2018-08-28 RX ORDER — FLUTICASONE PROPIONATE 50 MCG
SPRAY, SUSPENSION (ML) NASAL
Qty: 1 BOTTLE | Refills: 5 | Status: SHIPPED | OUTPATIENT
Start: 2018-08-28 | End: 2019-01-13

## 2018-09-24 RX ORDER — METOPROLOL SUCCINATE 25 MG/1
TABLET, EXTENDED RELEASE ORAL
Qty: 90 TABLET | Refills: 0 | Status: SHIPPED | OUTPATIENT
Start: 2018-09-24 | End: 2019-03-10

## 2018-09-24 NOTE — TELEPHONE ENCOUNTER
rx refilled as per written protocol.   Hypertensive Medications  Protocol Criteria:  · Appointment scheduled in the past 6 months or in the next 3 months  · BMP or CMP in the past 12 months  · Creatinine result < 2  Recent Outpatient Visits            1 mon

## 2018-10-25 ENCOUNTER — TELEPHONE (OUTPATIENT)
Dept: FAMILY MEDICINE CLINIC | Facility: CLINIC | Age: 58
End: 2018-10-25

## 2018-10-25 DIAGNOSIS — I10 ESSENTIAL HYPERTENSION: Primary | ICD-10-CM

## 2018-10-25 NOTE — TELEPHONE ENCOUNTER
pts lab from 1 year ago all normal, pt has an appt scheduled 11/13/18 for BP check Do you want to order any labs?

## 2018-11-10 ENCOUNTER — APPOINTMENT (OUTPATIENT)
Dept: LAB | Age: 58
End: 2018-11-10
Attending: FAMILY MEDICINE
Payer: COMMERCIAL

## 2018-11-10 DIAGNOSIS — I10 ESSENTIAL HYPERTENSION: ICD-10-CM

## 2018-11-10 PROCEDURE — 80048 BASIC METABOLIC PNL TOTAL CA: CPT

## 2018-11-10 PROCEDURE — 36415 COLL VENOUS BLD VENIPUNCTURE: CPT

## 2018-11-10 PROCEDURE — 80061 LIPID PANEL: CPT

## 2018-11-13 ENCOUNTER — OFFICE VISIT (OUTPATIENT)
Dept: FAMILY MEDICINE CLINIC | Facility: CLINIC | Age: 58
End: 2018-11-13
Payer: COMMERCIAL

## 2018-11-13 VITALS
RESPIRATION RATE: 18 BRPM | SYSTOLIC BLOOD PRESSURE: 121 MMHG | HEART RATE: 70 BPM | BODY MASS INDEX: 30 KG/M2 | TEMPERATURE: 98 F | DIASTOLIC BLOOD PRESSURE: 76 MMHG | WEIGHT: 170.19 LBS

## 2018-11-13 DIAGNOSIS — R25.2 MUSCLE CRAMP: ICD-10-CM

## 2018-11-13 DIAGNOSIS — I10 ESSENTIAL HYPERTENSION: Primary | ICD-10-CM

## 2018-11-13 DIAGNOSIS — F41.1 GAD (GENERALIZED ANXIETY DISORDER): ICD-10-CM

## 2018-11-13 PROCEDURE — 90686 IIV4 VACC NO PRSV 0.5 ML IM: CPT | Performed by: FAMILY MEDICINE

## 2018-11-13 PROCEDURE — 90471 IMMUNIZATION ADMIN: CPT | Performed by: FAMILY MEDICINE

## 2018-11-13 PROCEDURE — 99212 OFFICE O/P EST SF 10 MIN: CPT | Performed by: FAMILY MEDICINE

## 2018-11-13 PROCEDURE — 99214 OFFICE O/P EST MOD 30 MIN: CPT | Performed by: FAMILY MEDICINE

## 2018-11-13 RX ORDER — NEOMYCIN SULFATE, POLYMYXIN B SULFATE AND DEXAMETHASONE 3.5; 10000; 1 MG/ML; [USP'U]/ML; MG/ML
SUSPENSION/ DROPS OPHTHALMIC
Refills: 1 | COMMUNITY
Start: 2018-06-23 | End: 2019-04-22 | Stop reason: ALTCHOICE

## 2018-11-13 RX ORDER — CROMOLYN SODIUM 40 MG/ML
SOLUTION/ DROPS OPHTHALMIC
Refills: 0 | COMMUNITY
Start: 2018-09-26 | End: 2021-05-04 | Stop reason: ALTCHOICE

## 2018-11-14 NOTE — PROGRESS NOTES
HPI:    Patient ID: Maryjane Hall is a 65year old female. Hypertension   This is a chronic problem. The current episode started more than 1 year ago. The problem has been gradually improving since onset. The problem is controlled.  Pertinent negative sounds normal.   Lymphadenopathy:     She has no cervical adenopathy. Neurological: She is alert and oriented to person, place, and time. Skin: Skin is warm and dry.               ASSESSMENT/PLAN:   Essential hypertension  (primary encounter diagnosis)

## 2019-01-13 DIAGNOSIS — H10.10 ALLERGIC CONJUNCTIVITIS, UNSPECIFIED LATERALITY: ICD-10-CM

## 2019-01-13 RX ORDER — FLUTICASONE PROPIONATE 50 MCG
SPRAY, SUSPENSION (ML) NASAL
Qty: 3 BOTTLE | Refills: 0 | Status: SHIPPED | OUTPATIENT
Start: 2019-01-13 | End: 2019-04-16

## 2019-01-13 NOTE — TELEPHONE ENCOUNTER
Review pended refill request as it does not fall under a protocol.     Last Rx: 8-28-18  LOV: 11-13-18

## 2019-03-04 NOTE — TELEPHONE ENCOUNTER
Action Requested: Summary for Provider     []  Critical Lab, Recommendations Needed  [x] Need Additional Advice  []   FYI    []   Need Orders  [] Need Medications Sent to Pharmacy  []  Other     SUMMARY: Pt stated woke up yesterday with diarrhea x 4 ,took
Agree with triage advice. No further action.   [place] food, probiotics, fluids
Patient also informed per Dr. Contreras Benson to take probiotics. Patient states she usually eats activia yogurt for probiotics. Informed patient if she wants to do yogurt for probiotics to not eat yogurt right away.    Encouraged patient to eat BRAT diet and
done

## 2019-03-11 RX ORDER — METOPROLOL SUCCINATE 25 MG/1
TABLET, EXTENDED RELEASE ORAL
Qty: 90 TABLET | Refills: 0 | Status: SHIPPED | OUTPATIENT
Start: 2019-03-11 | End: 2019-06-15

## 2019-04-16 DIAGNOSIS — H10.10 ALLERGIC CONJUNCTIVITIS, UNSPECIFIED LATERALITY: ICD-10-CM

## 2019-04-16 RX ORDER — FLUTICASONE PROPIONATE 50 MCG
SPRAY, SUSPENSION (ML) NASAL
Qty: 3 BOTTLE | Refills: 1 | Status: SHIPPED | OUTPATIENT
Start: 2019-04-16 | End: 2019-10-22

## 2019-04-17 NOTE — TELEPHONE ENCOUNTER
Refill passed per [facility], [facility] protocol.     Requested Prescriptions   Pending Prescriptions Disp Refills   • FLUTICASONE PROPIONATE 50 MCG/ACT Nasal Suspension [Pharmacy Med Name: FLUTICASONE PROP 50 MCG SPRAY]  0     Sig: SPRAY [address] [place] [id number]

## 2019-04-22 ENCOUNTER — OFFICE VISIT (OUTPATIENT)
Dept: FAMILY MEDICINE CLINIC | Facility: CLINIC | Age: 59
End: 2019-04-22
Payer: COMMERCIAL

## 2019-04-22 VITALS
WEIGHT: 171.38 LBS | DIASTOLIC BLOOD PRESSURE: 76 MMHG | RESPIRATION RATE: 18 BRPM | TEMPERATURE: 99 F | SYSTOLIC BLOOD PRESSURE: 124 MMHG | HEIGHT: 63.5 IN | BODY MASS INDEX: 29.99 KG/M2 | HEART RATE: 80 BPM

## 2019-04-22 DIAGNOSIS — N62 MACROMASTIA: ICD-10-CM

## 2019-04-22 DIAGNOSIS — I10 HYPERTENSION, BENIGN: ICD-10-CM

## 2019-04-22 DIAGNOSIS — L72.0 INCLUSION CYST: ICD-10-CM

## 2019-04-22 DIAGNOSIS — S29.019A THORACIC MYOFASCIAL STRAIN, INITIAL ENCOUNTER: ICD-10-CM

## 2019-04-22 DIAGNOSIS — Z00.00 ROUTINE PHYSICAL EXAMINATION: Primary | ICD-10-CM

## 2019-04-22 DIAGNOSIS — Z12.31 ENCOUNTER FOR SCREENING MAMMOGRAM FOR BREAST CANCER: ICD-10-CM

## 2019-04-22 PROCEDURE — 99396 PREV VISIT EST AGE 40-64: CPT | Performed by: FAMILY MEDICINE

## 2019-04-22 NOTE — PROGRESS NOTES
HPI:    Patient ID: Nathaniel Sherman is a 63year old female. HPI    Review of Systems   Constitutional: Negative. Respiratory: Negative. Cardiovascular: Negative. Gastrointestinal: Negative. Musculoskeletal: Positive for back pain.    Skin: mammogram order given, colonoscopy up-to-date. Immunizations up-to-date. Reviewed 12/2018 fasting labs. Planning cataract surgery with Dr. Kierra Elliott. Macromastia–patient with chronic shoulder and upper back pain. Bra size DDD.   Ruts from bra straps a

## 2019-05-16 RX ORDER — SPIRONOLACTONE 50 MG/1
50 TABLET, FILM COATED ORAL DAILY
Qty: 90 TABLET | Refills: 3 | Status: SHIPPED | OUTPATIENT
Start: 2019-05-16 | End: 2019-11-18

## 2019-05-21 ENCOUNTER — TELEPHONE (OUTPATIENT)
Dept: FAMILY MEDICINE CLINIC | Facility: CLINIC | Age: 59
End: 2019-05-21

## 2019-06-15 RX ORDER — METOPROLOL SUCCINATE 25 MG/1
TABLET, EXTENDED RELEASE ORAL
Qty: 90 TABLET | Refills: 1 | Status: SHIPPED | OUTPATIENT
Start: 2019-06-15 | End: 2019-11-18

## 2019-06-15 NOTE — TELEPHONE ENCOUNTER
Refill passed per [facility], [facility] protocol.   Hypertensive Medications  Protocol Criteria:  · Appointment scheduled in the past 6 months or in the next 3 months  · BMP or CMP in the past 12 months  · Creatinine result < 2  Recent Outpatient Visits

## 2019-10-22 DIAGNOSIS — H10.10 ALLERGIC CONJUNCTIVITIS, UNSPECIFIED LATERALITY: ICD-10-CM

## 2019-10-23 RX ORDER — FLUTICASONE PROPIONATE 50 MCG
SPRAY, SUSPENSION (ML) NASAL
Qty: 3 BOTTLE | Refills: 1 | Status: SHIPPED | OUTPATIENT
Start: 2019-10-23 | End: 2020-05-07

## 2019-10-24 NOTE — TELEPHONE ENCOUNTER
Refill passed per [facility] protocol.     Requested Prescriptions   Pending Prescriptions Disp Refills   • FLUTICASONE PROPIONATE 50 MCG/ACT Nasal Suspension [Pharmacy Med Name: FLUTICASONE PROP 50 MCG SPRAY]  1     Sig: SPRAY 2 [facility]

## 2019-11-02 ENCOUNTER — TELEPHONE (OUTPATIENT)
Dept: FAMILY MEDICINE CLINIC | Facility: CLINIC | Age: 59
End: 2019-11-02

## 2019-11-02 DIAGNOSIS — Z00.00 ROUTINE PHYSICAL EXAMINATION: Primary | ICD-10-CM

## 2019-11-02 DIAGNOSIS — I10 HYPERTENSION, BENIGN: ICD-10-CM

## 2019-11-02 NOTE — TELEPHONE ENCOUNTER
Pt. requesting to get orders entered to get her annual blood work up done before she comes in for her f/up appt. On 11/9/19. Please call pt when order is ready.

## 2019-11-09 ENCOUNTER — APPOINTMENT (OUTPATIENT)
Dept: LAB | Age: 59
End: 2019-11-09
Attending: FAMILY MEDICINE
Payer: COMMERCIAL

## 2019-11-09 DIAGNOSIS — I10 HYPERTENSION, BENIGN: ICD-10-CM

## 2019-11-09 DIAGNOSIS — Z00.00 ROUTINE PHYSICAL EXAMINATION: ICD-10-CM

## 2019-11-09 PROCEDURE — 36415 COLL VENOUS BLD VENIPUNCTURE: CPT

## 2019-11-09 PROCEDURE — 80061 LIPID PANEL: CPT

## 2019-11-09 PROCEDURE — 80048 BASIC METABOLIC PNL TOTAL CA: CPT

## 2019-11-18 ENCOUNTER — OFFICE VISIT (OUTPATIENT)
Dept: FAMILY MEDICINE CLINIC | Facility: CLINIC | Age: 59
End: 2019-11-18
Payer: COMMERCIAL

## 2019-11-18 VITALS
WEIGHT: 172.81 LBS | SYSTOLIC BLOOD PRESSURE: 114 MMHG | TEMPERATURE: 98 F | BODY MASS INDEX: 30.62 KG/M2 | DIASTOLIC BLOOD PRESSURE: 69 MMHG | HEIGHT: 63 IN | RESPIRATION RATE: 18 BRPM | HEART RATE: 59 BPM

## 2019-11-18 DIAGNOSIS — H26.9 CATARACT OF RIGHT EYE, UNSPECIFIED CATARACT TYPE: ICD-10-CM

## 2019-11-18 DIAGNOSIS — I10 ESSENTIAL HYPERTENSION: Primary | ICD-10-CM

## 2019-11-18 DIAGNOSIS — E66.9 NON MORBID OBESITY, UNSPECIFIED OBESITY TYPE: ICD-10-CM

## 2019-11-18 PROBLEM — N95.0 POSTMENOPAUSAL BLEEDING: Status: RESOLVED | Noted: 2018-04-09 | Resolved: 2019-11-18

## 2019-11-18 PROCEDURE — 90686 IIV4 VACC NO PRSV 0.5 ML IM: CPT | Performed by: FAMILY MEDICINE

## 2019-11-18 PROCEDURE — 99213 OFFICE O/P EST LOW 20 MIN: CPT | Performed by: FAMILY MEDICINE

## 2019-11-18 PROCEDURE — 90471 IMMUNIZATION ADMIN: CPT | Performed by: FAMILY MEDICINE

## 2019-11-18 RX ORDER — SPIRONOLACTONE 100 MG/1
100 TABLET, FILM COATED ORAL DAILY
Qty: 90 TABLET | Refills: 1 | Status: SHIPPED | OUTPATIENT
Start: 2019-11-18 | End: 2019-11-25 | Stop reason: SINTOL

## 2019-11-18 NOTE — PROGRESS NOTES
HPI:    Patient ID: Triston An is a 63year old female. Hypertension   This is a chronic problem. The current episode started more than 1 year ago. The problem is unchanged. The problem is controlled.  Pertinent negatives include no anxiety, chest she is experiencing some leg cramps which she suspects is due to metoprolol. Plan to discontinue metoprolol and increase spironolactone. Reviewed recent labs which were excellent. Flu immunization today.     Cataract of right eye, unspecified cataract ty

## 2019-11-25 ENCOUNTER — TELEPHONE (OUTPATIENT)
Dept: FAMILY MEDICINE CLINIC | Facility: CLINIC | Age: 59
End: 2019-11-25

## 2019-11-25 RX ORDER — SPIRONOLACTONE 50 MG/1
50 TABLET, FILM COATED ORAL DAILY
Qty: 90 TABLET | Refills: 3 | Status: SHIPPED | OUTPATIENT
Start: 2019-11-25 | End: 2020-10-16 | Stop reason: SINTOL

## 2019-11-25 NOTE — TELEPHONE ENCOUNTER
Patient states after increasing her Spironolactone from 50 mg to 100 mg, she began having headaches for days until she went back to taking 50 mg. Patient states she remains off of Metoprolol, and wanting to inform Dr. John Arguelles.   Patient still has approximat

## 2019-11-25 NOTE — TELEPHONE ENCOUNTER
Per patient Dr Terry Crooked double her dose for spironolactone and patient states that started after 2 days she had headaches and so she went back to her old dosage. Transfer to triage.

## 2019-11-25 NOTE — TELEPHONE ENCOUNTER
Please inform patient that if blood pressure remains controlled okay to stay with spironolactone 50 mg daily. Stay off of metoprolol as it seemed to cause leg cramps.   I will send refill to pharmacy for spironolactone 50 mg and let them know to cancel 100

## 2019-12-12 RX ORDER — METOPROLOL SUCCINATE 25 MG/1
TABLET, EXTENDED RELEASE ORAL
Qty: 90 TABLET | Refills: 3 | Status: SHIPPED | OUTPATIENT
Start: 2019-12-12 | End: 2020-02-03

## 2019-12-12 NOTE — TELEPHONE ENCOUNTER
Please review; protocol failed. Discontinued by provider    Essential hypertension  (primary encounter diagnosis)-patient is taking metoprolol 25 mg daily and spironolactone 50 mg daily. Pressure well controlled but she is experiencing some leg cramps which she suspects is due to metoprolol. Plan to discontinue metoprolol and increase spironolactone. Reviewed recent labs which were excellent. Flu immunization today.

## 2020-01-31 ENCOUNTER — NURSE TRIAGE (OUTPATIENT)
Dept: FAMILY MEDICINE CLINIC | Facility: CLINIC | Age: 60
End: 2020-01-31

## 2020-01-31 NOTE — TELEPHONE ENCOUNTER
Pt informed of Dr Fong Anger response below. Reviewed proper hydration with pt including and dehydration s/s to lookout for and to go to ER/IC if experiences any of them and pt agrees.

## 2020-01-31 NOTE — TELEPHONE ENCOUNTER
Action Requested: Summary for Provider     []  Critical Lab, Recommendations Needed  [] Need Additional Advice  []   FYI    []   Need Orders  [] Need Medications Sent to Pharmacy  []  Other     SUMMARY: Patient requesting treatment recommendations for abdo

## 2020-02-03 ENCOUNTER — OFFICE VISIT (OUTPATIENT)
Dept: FAMILY MEDICINE CLINIC | Facility: CLINIC | Age: 60
End: 2020-02-03
Payer: COMMERCIAL

## 2020-02-03 VITALS
WEIGHT: 172 LBS | SYSTOLIC BLOOD PRESSURE: 130 MMHG | RESPIRATION RATE: 18 BRPM | BODY MASS INDEX: 30.48 KG/M2 | DIASTOLIC BLOOD PRESSURE: 80 MMHG | HEIGHT: 63 IN | HEART RATE: 105 BPM

## 2020-02-03 DIAGNOSIS — K52.9 GASTROENTERITIS: ICD-10-CM

## 2020-02-03 DIAGNOSIS — I10 HYPERTENSION, BENIGN: Primary | ICD-10-CM

## 2020-02-03 PROCEDURE — 99213 OFFICE O/P EST LOW 20 MIN: CPT | Performed by: FAMILY MEDICINE

## 2020-02-03 NOTE — PROGRESS NOTES
HPI:    Patient ID: Abida Viveros is a 63year old female. Hypertension   This is a chronic problem. The current episode started more than 1 year ago. The problem has been waxing and waning since onset. The problem is controlled.  Pertinent negatives physical/2020 (although may come in sooner for preop cataract surgery physical). Gastroenteritis–last week, see telephone encounter. Symptoms now resolved. No orders of the defined types were placed in this encounter.       Meds This Visit:  Requeste

## 2020-02-25 ENCOUNTER — NURSE TRIAGE (OUTPATIENT)
Dept: FAMILY MEDICINE CLINIC | Facility: CLINIC | Age: 60
End: 2020-02-25

## 2020-02-25 NOTE — TELEPHONE ENCOUNTER
Action Requested: Summary for Provider     []  Critical Lab, Recommendations Needed  [] Need Additional Advice  []   FYI    []   Need Orders  [] Need Medications Sent to Pharmacy  []  Other     SUMMARY: Dr Bj Palacios, any more advice or changes to advise?  Katie Beltre

## 2020-02-28 NOTE — TELEPHONE ENCOUNTER
Patient states she's very worried about a possible infection as she still has a small lump on her vulva she believes began after she shaved. When asked if patient bled at all after shaving she stated \"no. \"  Informed patient it may be an ingrown hair and

## 2020-03-03 ENCOUNTER — OFFICE VISIT (OUTPATIENT)
Dept: FAMILY MEDICINE CLINIC | Facility: CLINIC | Age: 60
End: 2020-03-03
Payer: COMMERCIAL

## 2020-03-03 VITALS
WEIGHT: 170.63 LBS | SYSTOLIC BLOOD PRESSURE: 134 MMHG | BODY MASS INDEX: 30.23 KG/M2 | HEIGHT: 63 IN | DIASTOLIC BLOOD PRESSURE: 75 MMHG

## 2020-03-03 DIAGNOSIS — L73.9 FOLLICULITIS: Primary | ICD-10-CM

## 2020-03-03 PROCEDURE — 99213 OFFICE O/P EST LOW 20 MIN: CPT | Performed by: FAMILY MEDICINE

## 2020-03-03 NOTE — PROGRESS NOTES
HPI:    Patient ID: Beau Baron is a 63year old female. genital pain   The patient's primary symptoms include genital lesions. This is a new problem. The current episode started in the past 7 days. The problem has been gradually improving.  The paramjit

## 2020-05-07 DIAGNOSIS — H10.10 ALLERGIC CONJUNCTIVITIS, UNSPECIFIED LATERALITY: ICD-10-CM

## 2020-05-07 RX ORDER — FLUTICASONE PROPIONATE 50 MCG
SPRAY, SUSPENSION (ML) NASAL
Qty: 1 BOTTLE | Refills: 3 | Status: SHIPPED | OUTPATIENT
Start: 2020-05-07 | End: 2020-07-30

## 2020-06-03 ENCOUNTER — TELEPHONE (OUTPATIENT)
Dept: FAMILY MEDICINE CLINIC | Facility: CLINIC | Age: 60
End: 2020-06-03

## 2020-06-03 NOTE — TELEPHONE ENCOUNTER
Please let her know it is fine to use NeuEve instead of prescribed medication. It is over-the-counter without a prescription.

## 2020-06-03 NOTE — TELEPHONE ENCOUNTER
Patient states that Dr. Salinas Braga prescribed her a medication for vaginal dryness and hot flashes previously. Patient saw a commercial for NeuEve recently and she felt that it took care of more symptoms than the prescription medication.  Patient is wondering i

## 2020-06-13 ENCOUNTER — TELEPHONE (OUTPATIENT)
Dept: FAMILY MEDICINE CLINIC | Facility: CLINIC | Age: 60
End: 2020-06-13

## 2020-06-13 ENCOUNTER — OFFICE VISIT (OUTPATIENT)
Dept: FAMILY MEDICINE CLINIC | Facility: CLINIC | Age: 60
End: 2020-06-13
Payer: COMMERCIAL

## 2020-06-13 VITALS
DIASTOLIC BLOOD PRESSURE: 88 MMHG | WEIGHT: 170 LBS | TEMPERATURE: 97 F | HEART RATE: 76 BPM | SYSTOLIC BLOOD PRESSURE: 137 MMHG | BODY MASS INDEX: 30 KG/M2

## 2020-06-13 DIAGNOSIS — Z01.419 ROUTINE GYNECOLOGICAL EXAMINATION: Primary | ICD-10-CM

## 2020-06-13 DIAGNOSIS — Z12.31 ENCOUNTER FOR SCREENING MAMMOGRAM FOR BREAST CANCER: ICD-10-CM

## 2020-06-13 DIAGNOSIS — N76.1 SUBACUTE VAGINITIS: ICD-10-CM

## 2020-06-13 DIAGNOSIS — I10 HYPERTENSION, BENIGN: ICD-10-CM

## 2020-06-13 DIAGNOSIS — N95.2 ATROPHIC VAGINITIS: ICD-10-CM

## 2020-06-13 PROCEDURE — 99396 PREV VISIT EST AGE 40-64: CPT | Performed by: FAMILY MEDICINE

## 2020-06-13 RX ORDER — ESTRADIOL 10 UG/1
10 INSERT VAGINAL
Qty: 25 TABLET | Refills: 3 | Status: SHIPPED | OUTPATIENT
Start: 2020-06-15 | End: 2020-07-15

## 2020-06-13 RX ORDER — ESTRADIOL 0.1 MG/G
CREAM VAGINAL
Qty: 42 G | Refills: 3 | Status: SHIPPED | OUTPATIENT
Start: 2020-06-13 | End: 2020-10-16 | Stop reason: ALTCHOICE

## 2020-06-13 NOTE — PROGRESS NOTES
HPI:    Patient ID: Lorenza Mcghee is a 63year old female. Gyn Exam         Review of Systems   Constitutional: Negative. Respiratory: Negative. Cardiovascular: Negative. Gastrointestinal: Negative.     Genitourinary: Positive for vaginal paramjit irritation with intercourse. Using lubricant. On exam atrophic vaginitis. Estradiol cream as directed reviewed instructions and side effects. Check vaginosis screen. Further treatment pending results.     Atrophic vaginitis–as above    Encounter for sc

## 2020-06-15 NOTE — TELEPHONE ENCOUNTER
Message #  2020 12:01p [CHARLEE]  To:  From: MELVI Gilbert MD:  Phone#:  ----------------------------------------------------------------------  Thom Martinez [phone]  61 RE NEW RX NOT COVERED BY  INSURANCE.  CAN IT BE CHANGE TO

## 2020-07-30 DIAGNOSIS — H10.10 ALLERGIC CONJUNCTIVITIS, UNSPECIFIED LATERALITY: ICD-10-CM

## 2020-07-30 RX ORDER — FLUTICASONE PROPIONATE 50 MCG
2 SPRAY, SUSPENSION (ML) NASAL DAILY
Qty: 1 BOTTLE | Refills: 3 | Status: SHIPPED | OUTPATIENT
Start: 2020-07-30 | End: 2020-12-06

## 2020-09-16 ENCOUNTER — TELEPHONE (OUTPATIENT)
Dept: FAMILY MEDICINE CLINIC | Facility: CLINIC | Age: 60
End: 2020-09-16

## 2020-09-16 DIAGNOSIS — Z00.00 ROUTINE PHYSICAL EXAMINATION: Primary | ICD-10-CM

## 2020-09-16 NOTE — TELEPHONE ENCOUNTER
Patient needs yearly lab orders to check for hypertension. Please call patient when orders are in place.

## 2020-09-17 NOTE — TELEPHONE ENCOUNTER
Suha Justice, patient is schedule for a follow up on 10/09/2020. Patient is requesting blood test order for appointment. Please advise.

## 2020-10-16 ENCOUNTER — TELEPHONE (OUTPATIENT)
Dept: FAMILY MEDICINE CLINIC | Facility: CLINIC | Age: 60
End: 2020-10-16

## 2020-10-16 ENCOUNTER — OFFICE VISIT (OUTPATIENT)
Dept: FAMILY MEDICINE CLINIC | Facility: CLINIC | Age: 60
End: 2020-10-16
Payer: COMMERCIAL

## 2020-10-16 ENCOUNTER — LAB ENCOUNTER (OUTPATIENT)
Dept: LAB | Age: 60
End: 2020-10-16
Attending: FAMILY MEDICINE
Payer: COMMERCIAL

## 2020-10-16 VITALS
DIASTOLIC BLOOD PRESSURE: 82 MMHG | RESPIRATION RATE: 18 BRPM | SYSTOLIC BLOOD PRESSURE: 126 MMHG | TEMPERATURE: 98 F | BODY MASS INDEX: 30.9 KG/M2 | HEART RATE: 74 BPM | WEIGHT: 174.38 LBS | HEIGHT: 63 IN

## 2020-10-16 DIAGNOSIS — H26.9 CATARACT, UNSPECIFIED CATARACT TYPE, UNSPECIFIED LATERALITY: ICD-10-CM

## 2020-10-16 DIAGNOSIS — Z01.818 PREOP EXAMINATION: Primary | ICD-10-CM

## 2020-10-16 DIAGNOSIS — R94.31 ABNORMAL EKG: ICD-10-CM

## 2020-10-16 DIAGNOSIS — I10 ESSENTIAL HYPERTENSION: ICD-10-CM

## 2020-10-16 DIAGNOSIS — N95.2 ATROPHIC VAGINITIS: ICD-10-CM

## 2020-10-16 DIAGNOSIS — Z00.00 ROUTINE PHYSICAL EXAMINATION: ICD-10-CM

## 2020-10-16 PROCEDURE — 3074F SYST BP LT 130 MM HG: CPT | Performed by: FAMILY MEDICINE

## 2020-10-16 PROCEDURE — 3008F BODY MASS INDEX DOCD: CPT | Performed by: FAMILY MEDICINE

## 2020-10-16 PROCEDURE — 80061 LIPID PANEL: CPT

## 2020-10-16 PROCEDURE — 93000 ELECTROCARDIOGRAM COMPLETE: CPT | Performed by: FAMILY MEDICINE

## 2020-10-16 PROCEDURE — 90471 IMMUNIZATION ADMIN: CPT | Performed by: FAMILY MEDICINE

## 2020-10-16 PROCEDURE — 80053 COMPREHEN METABOLIC PANEL: CPT

## 2020-10-16 PROCEDURE — 3079F DIAST BP 80-89 MM HG: CPT | Performed by: FAMILY MEDICINE

## 2020-10-16 PROCEDURE — 90686 IIV4 VACC NO PRSV 0.5 ML IM: CPT | Performed by: FAMILY MEDICINE

## 2020-10-16 PROCEDURE — 36415 COLL VENOUS BLD VENIPUNCTURE: CPT

## 2020-10-16 PROCEDURE — 99214 OFFICE O/P EST MOD 30 MIN: CPT | Performed by: FAMILY MEDICINE

## 2020-10-16 RX ORDER — EPLERENONE 50 MG/1
50 TABLET, FILM COATED ORAL DAILY
Qty: 30 TABLET | Refills: 5 | Status: SHIPPED | OUTPATIENT
Start: 2020-10-16 | End: 2021-01-26

## 2020-10-16 NOTE — TELEPHONE ENCOUNTER
Patient stated medication below is too expensive. Patient is requesting to stay on spironolactone 50 MG Oral Tab. Please advise. eplerenone 50 MG Oral Tab 30 tablet 5 10/16/2020    Sig:   Take 1 tablet (50 mg total) by mouth daily.      Route: [place] Fent

## 2020-10-16 NOTE — PROGRESS NOTES
HPI:    Patient ID: Rachael Mccoy is a 63year old female. Patient presents for preoperative physical upcoming cataract surgery. Also follow-up hypertension as below. Review of Systems   Constitutional: Negative. Respiratory: Negative.     Ca Plan to continue with lubricants unless coverage changes.     Orders Placed This Encounter      A flu <65 Flulaval 0.5 ml 6 mon and older Quad single dose PF (24194)      Meds This Visit:  Requested Prescriptions     Signed Prescriptions Disp Refills   • ep

## 2020-10-17 RX ORDER — SPIRONOLACTONE 50 MG/1
50 TABLET, FILM COATED ORAL DAILY
Qty: 90 TABLET | Refills: 0 | Status: SHIPPED | OUTPATIENT
Start: 2020-10-17 | End: 2021-01-26

## 2020-11-10 ENCOUNTER — TELEPHONE (OUTPATIENT)
Dept: FAMILY MEDICINE CLINIC | Facility: CLINIC | Age: 60
End: 2020-11-10

## 2020-11-10 NOTE — TELEPHONE ENCOUNTER
Patient is requesting to send her mammogram order to the [facility].  (patient will call us back with fax #)  Thank you.

## 2020-12-04 DIAGNOSIS — H10.10 ALLERGIC CONJUNCTIVITIS, UNSPECIFIED LATERALITY: ICD-10-CM

## 2020-12-06 RX ORDER — FLUTICASONE PROPIONATE 50 MCG
2 SPRAY, SUSPENSION (ML) NASAL DAILY
Qty: 1 BOTTLE | Refills: 3 | Status: SHIPPED | OUTPATIENT
Start: 2020-12-06 | End: 2021-03-06

## 2021-01-09 ENCOUNTER — TELEPHONE (OUTPATIENT)
Dept: FAMILY MEDICINE CLINIC | Facility: CLINIC | Age: 61
End: 2021-01-09

## 2021-01-26 RX ORDER — SPIRONOLACTONE 50 MG/1
TABLET, FILM COATED ORAL
Qty: 90 TABLET | Refills: 3 | Status: SHIPPED | OUTPATIENT
Start: 2021-01-26 | End: 2021-12-08

## 2021-03-06 DIAGNOSIS — H10.10 ALLERGIC CONJUNCTIVITIS, UNSPECIFIED LATERALITY: ICD-10-CM

## 2021-03-06 RX ORDER — FLUTICASONE PROPIONATE 50 MCG
2 SPRAY, SUSPENSION (ML) NASAL DAILY
Qty: 1 INHALER | Refills: 1 | Status: SHIPPED | OUTPATIENT
Start: 2021-03-06 | End: 2021-03-29

## 2021-03-25 ENCOUNTER — TELEPHONE (OUTPATIENT)
Dept: FAMILY MEDICINE CLINIC | Facility: CLINIC | Age: 61
End: 2021-03-25

## 2021-03-25 NOTE — TELEPHONE ENCOUNTER
Pt would like to speak with a nurse at the site. She will be proceeding with the cataract surgery. And she wants to know if she should get the stress test done. Please call her back. Thank you.

## 2021-03-29 DIAGNOSIS — H10.10 ALLERGIC CONJUNCTIVITIS, UNSPECIFIED LATERALITY: ICD-10-CM

## 2021-03-29 RX ORDER — FLUTICASONE PROPIONATE 50 MCG
2 SPRAY, SUSPENSION (ML) NASAL DAILY
Qty: 3 BOTTLE | Refills: 3 | Status: SHIPPED | OUTPATIENT
Start: 2021-03-29 | End: 2022-02-14

## 2021-04-06 ENCOUNTER — LAB ENCOUNTER (OUTPATIENT)
Dept: LAB | Age: 61
End: 2021-04-06
Attending: FAMILY MEDICINE
Payer: COMMERCIAL

## 2021-04-06 DIAGNOSIS — R94.31 ABNORMAL EKG: ICD-10-CM

## 2021-04-06 DIAGNOSIS — Z01.818 PREOP EXAMINATION: ICD-10-CM

## 2021-04-09 ENCOUNTER — HOSPITAL ENCOUNTER (OUTPATIENT)
Dept: CV DIAGNOSTICS | Facility: HOSPITAL | Age: 61
Discharge: HOME | End: 2021-04-09
Attending: FAMILY MEDICINE
Payer: COMMERCIAL

## 2021-04-09 DIAGNOSIS — R94.31 ABNORMAL EKG: ICD-10-CM

## 2021-04-09 PROCEDURE — 93018 CV STRESS TEST I&R ONLY: CPT | Performed by: FAMILY MEDICINE

## 2021-04-09 PROCEDURE — 93016 CV STRESS TEST SUPVJ ONLY: CPT | Performed by: FAMILY MEDICINE

## 2021-04-09 PROCEDURE — 93017 CV STRESS TEST TRACING ONLY: CPT | Performed by: FAMILY MEDICINE

## 2021-04-09 NOTE — IMAGING NOTE
Patients's HR in the 120's-115 28 mins. into recovery. Dr. Elidia Do notified ok'd to end test. Pt pain free.

## 2021-04-13 ENCOUNTER — OFFICE VISIT (OUTPATIENT)
Dept: FAMILY MEDICINE CLINIC | Facility: CLINIC | Age: 61
End: 2021-04-13
Payer: COMMERCIAL

## 2021-04-13 VITALS
RESPIRATION RATE: 18 BRPM | WEIGHT: 171.38 LBS | HEART RATE: 86 BPM | TEMPERATURE: 97 F | SYSTOLIC BLOOD PRESSURE: 140 MMHG | BODY MASS INDEX: 30.37 KG/M2 | DIASTOLIC BLOOD PRESSURE: 81 MMHG | HEIGHT: 63 IN

## 2021-04-13 DIAGNOSIS — I10 HYPERTENSION, BENIGN: Primary | ICD-10-CM

## 2021-04-13 DIAGNOSIS — R94.39 ABNORMAL STRESS ECG: ICD-10-CM

## 2021-04-13 DIAGNOSIS — S29.019A THORACIC MYOFASCIAL STRAIN, INITIAL ENCOUNTER: ICD-10-CM

## 2021-04-13 PROCEDURE — 99213 OFFICE O/P EST LOW 20 MIN: CPT | Performed by: FAMILY MEDICINE

## 2021-04-13 PROCEDURE — 3079F DIAST BP 80-89 MM HG: CPT | Performed by: FAMILY MEDICINE

## 2021-04-13 PROCEDURE — 3077F SYST BP >= 140 MM HG: CPT | Performed by: FAMILY MEDICINE

## 2021-04-13 PROCEDURE — 3008F BODY MASS INDEX DOCD: CPT | Performed by: FAMILY MEDICINE

## 2021-04-13 RX ORDER — CARVEDILOL 6.25 MG/1
6.25 TABLET ORAL 2 TIMES DAILY WITH MEALS
Qty: 180 TABLET | Refills: 3 | Status: SHIPPED | OUTPATIENT
Start: 2021-04-13

## 2021-04-13 NOTE — PROGRESS NOTES
HPI/Subjective:   Patient ID: Uriah Choudhury is a 62year old female. Patient presents for follow-up on hypertension, abnormal stress test and ear issues as below. Ear Problem   There is pain in the right ear. This is a recurrent problem.  The jovana reviewed instructions and side effects. Follow-up here in 3 weeks. Abnormal stress ECG–patient had abnormal EKG done for preop cataract surgery 10/2020. Stress test was ordered at that time but just completed. Reviewed results.   Patient denies chest

## 2021-04-27 ENCOUNTER — OFFICE VISIT (OUTPATIENT)
Dept: CARDIOLOGY CLINIC | Facility: CLINIC | Age: 61
End: 2021-04-27
Payer: COMMERCIAL

## 2021-04-27 VITALS
HEIGHT: 63 IN | HEART RATE: 72 BPM | SYSTOLIC BLOOD PRESSURE: 137 MMHG | RESPIRATION RATE: 18 BRPM | WEIGHT: 173 LBS | BODY MASS INDEX: 30.65 KG/M2 | DIASTOLIC BLOOD PRESSURE: 80 MMHG

## 2021-04-27 DIAGNOSIS — R94.39 ABNORMAL STRESS TEST: Primary | ICD-10-CM

## 2021-04-27 PROCEDURE — 99203 OFFICE O/P NEW LOW 30 MIN: CPT | Performed by: NURSE PRACTITIONER

## 2021-04-27 PROCEDURE — 3075F SYST BP GE 130 - 139MM HG: CPT | Performed by: NURSE PRACTITIONER

## 2021-04-27 PROCEDURE — 3079F DIAST BP 80-89 MM HG: CPT | Performed by: NURSE PRACTITIONER

## 2021-04-27 PROCEDURE — 3008F BODY MASS INDEX DOCD: CPT | Performed by: NURSE PRACTITIONER

## 2021-04-27 NOTE — PROGRESS NOTES
Maryjane Hall is a 62year old female. Patient presents with:  Consult  Hypertension    HPI:   Patient comes in today for consultation. She sees Dr. Sybil Willis.   She is here for an abnormal GXT stress test.  She has history of hypertension but no diabetes or rashes  RESPIRATORY: denies shortness of breath with exertion  CARDIOVASCULAR: no chest pain  GI: denies abdominal pain and denies heartburn  NEURO: denies headaches    EXAM:   /80   Pulse 72   Resp 18   Ht 5' 3\" (1.6 m)   Wt 173 lb (78.5 kg)   LMP

## 2021-05-04 ENCOUNTER — OFFICE VISIT (OUTPATIENT)
Dept: FAMILY MEDICINE CLINIC | Facility: CLINIC | Age: 61
End: 2021-05-04
Payer: COMMERCIAL

## 2021-05-04 VITALS
HEIGHT: 63 IN | TEMPERATURE: 97 F | BODY MASS INDEX: 30.69 KG/M2 | DIASTOLIC BLOOD PRESSURE: 76 MMHG | RESPIRATION RATE: 18 BRPM | SYSTOLIC BLOOD PRESSURE: 131 MMHG | HEART RATE: 57 BPM | WEIGHT: 173.19 LBS

## 2021-05-04 DIAGNOSIS — I10 HYPERTENSION, BENIGN: Primary | ICD-10-CM

## 2021-05-04 DIAGNOSIS — R94.39 ABNORMAL STRESS ECG: ICD-10-CM

## 2021-05-04 PROCEDURE — 3078F DIAST BP <80 MM HG: CPT | Performed by: FAMILY MEDICINE

## 2021-05-04 PROCEDURE — 3008F BODY MASS INDEX DOCD: CPT | Performed by: FAMILY MEDICINE

## 2021-05-04 PROCEDURE — 3075F SYST BP GE 130 - 139MM HG: CPT | Performed by: FAMILY MEDICINE

## 2021-05-04 PROCEDURE — 99213 OFFICE O/P EST LOW 20 MIN: CPT | Performed by: FAMILY MEDICINE

## 2021-05-04 NOTE — PROGRESS NOTES
HPI/Subjective:   Patient ID: Nathaniel Sherman is a 62year old female. Hypertension  This is a chronic problem. The current episode started more than 1 year ago. The problem has been gradually improving since onset. The problem is controlled.  Pertinent carvedilol which was added at last visit. Blood pressures have been normal.  Plan to continue current medications. Abnormal stress ECG–test ordered as patient had abnormal preop EKG for cataract surgery.   Saw cardiology, has stress test scheduled 5/13/

## 2021-05-10 ENCOUNTER — LAB ENCOUNTER (OUTPATIENT)
Dept: LAB | Age: 61
End: 2021-05-10
Attending: NURSE PRACTITIONER
Payer: COMMERCIAL

## 2021-05-10 DIAGNOSIS — R94.39 ABNORMAL STRESS TEST: ICD-10-CM

## 2021-05-12 ENCOUNTER — HOSPITAL ENCOUNTER (OUTPATIENT)
Dept: CV DIAGNOSTICS | Facility: HOSPITAL | Age: 61
Discharge: HOME | End: 2021-05-12
Attending: NURSE PRACTITIONER
Payer: COMMERCIAL

## 2021-05-12 DIAGNOSIS — R94.39 ABNORMAL STRESS TEST: ICD-10-CM

## 2021-05-12 PROCEDURE — 93017 CV STRESS TEST TRACING ONLY: CPT | Performed by: NURSE PRACTITIONER

## 2021-05-12 PROCEDURE — 93350 STRESS TTE ONLY: CPT | Performed by: NURSE PRACTITIONER

## 2021-05-12 PROCEDURE — 93018 CV STRESS TEST I&R ONLY: CPT | Performed by: NURSE PRACTITIONER

## 2021-05-12 PROCEDURE — 93016 CV STRESS TEST SUPVJ ONLY: CPT | Performed by: NURSE PRACTITIONER

## 2021-05-13 ENCOUNTER — TELEPHONE (OUTPATIENT)
Dept: CARDIOLOGY CLINIC | Facility: CLINIC | Age: 61
End: 2021-05-13

## 2021-05-13 NOTE — TELEPHONE ENCOUNTER
Spoke with Edwige Godwin, reviewed results of stress test and that she may proceed with surgery.  She reported she is having cataract surgery on 5/25/21 at:  [facility],  Phone [phone] fax [phone] will generate letter and fax to Dr. Anjel Barrow

## 2021-07-13 NOTE — TELEPHONE ENCOUNTER
Pt wanted to know biopsy results. Informed of normal results.      Notes recorded by Demarcus Crystal MD on 4/10/2018 at 3:55 PM CDT  Not able to contact patient by phone. [facility] try and contact patient and inform her that her EMBx is normal.  No further 58 yo M, hx of afib sp ablation, formerly on cardizem, former heavy beer drinker, stopped x 5 months, recently eating frozen dinners/delivery since onset of pandemic, here for assessment of exertional dyspnea, LE edema and general malaise. No CP, fever, chills, vomiting, diarrhea, abdominal pain, dark or bloody stools, notes he had an ulcer as a child but denies epigastric pain currently.     Patient has not had regular medical care.     Vs notable for tachycardia. Patient looks pale, has rales bilaterally, is tachycardic, has JVD, pitting edema.    Labs notable for Hgb of 4.9, microcytic anemia with iron deficiency, elevated BNP, mildly elevated trop, CXR with pulmonary vascular congestion.     Findings/history suggestive of new onset heart failure, profound iron deficicency anemia --- HF could be related to long standing anemia vs primarily cardiac.     Will diurese and slowly transfuse, admit to tele for further work up.

## 2021-12-08 ENCOUNTER — OFFICE VISIT (OUTPATIENT)
Dept: FAMILY MEDICINE CLINIC | Facility: CLINIC | Age: 61
End: 2021-12-08
Payer: COMMERCIAL

## 2021-12-08 ENCOUNTER — LAB ENCOUNTER (OUTPATIENT)
Dept: LAB | Age: 61
End: 2021-12-08
Attending: FAMILY MEDICINE
Payer: COMMERCIAL

## 2021-12-08 VITALS
DIASTOLIC BLOOD PRESSURE: 74 MMHG | BODY MASS INDEX: 29.9 KG/M2 | HEIGHT: 62.5 IN | WEIGHT: 166.63 LBS | HEART RATE: 80 BPM | SYSTOLIC BLOOD PRESSURE: 128 MMHG

## 2021-12-08 DIAGNOSIS — Z12.31 ENCOUNTER FOR SCREENING MAMMOGRAM FOR BREAST CANCER: ICD-10-CM

## 2021-12-08 DIAGNOSIS — Z00.00 ROUTINE PHYSICAL EXAMINATION: Primary | ICD-10-CM

## 2021-12-08 DIAGNOSIS — N95.8 GENITOURINARY SYNDROME OF MENOPAUSE: ICD-10-CM

## 2021-12-08 DIAGNOSIS — I10 HYPERTENSION, BENIGN: ICD-10-CM

## 2021-12-08 DIAGNOSIS — Z00.00 ROUTINE PHYSICAL EXAMINATION: ICD-10-CM

## 2021-12-08 PROCEDURE — 90471 IMMUNIZATION ADMIN: CPT | Performed by: FAMILY MEDICINE

## 2021-12-08 PROCEDURE — 3008F BODY MASS INDEX DOCD: CPT | Performed by: FAMILY MEDICINE

## 2021-12-08 PROCEDURE — 80053 COMPREHEN METABOLIC PANEL: CPT

## 2021-12-08 PROCEDURE — 99396 PREV VISIT EST AGE 40-64: CPT | Performed by: FAMILY MEDICINE

## 2021-12-08 PROCEDURE — 90686 IIV4 VACC NO PRSV 0.5 ML IM: CPT | Performed by: FAMILY MEDICINE

## 2021-12-08 PROCEDURE — 3078F DIAST BP <80 MM HG: CPT | Performed by: FAMILY MEDICINE

## 2021-12-08 PROCEDURE — 36415 COLL VENOUS BLD VENIPUNCTURE: CPT

## 2021-12-08 PROCEDURE — 80061 LIPID PANEL: CPT

## 2021-12-08 PROCEDURE — 85027 COMPLETE CBC AUTOMATED: CPT

## 2021-12-08 PROCEDURE — 3074F SYST BP LT 130 MM HG: CPT | Performed by: FAMILY MEDICINE

## 2021-12-08 RX ORDER — SPIRONOLACTONE 50 MG/1
50 TABLET, FILM COATED ORAL DAILY
Qty: 90 TABLET | Refills: 3 | Status: SHIPPED | OUTPATIENT
Start: 2021-12-08

## 2021-12-08 RX ORDER — ESTRADIOL 0.1 MG/G
1 CREAM VAGINAL
Qty: 42 G | Refills: 3 | Status: SHIPPED | OUTPATIENT
Start: 2021-12-09

## 2021-12-08 NOTE — PROGRESS NOTES
Subjective:   Patient ID: Yung Deras is a 62year old female. Patient presents for routine physical      History/Other:   Review of Systems   Constitutional: Negative. Respiratory: Negative. Cardiovascular: Negative.     Gastrointestinal: Neg 2020  Mammogram order given  Colonoscopy 2013 normal  Fasting labs done today  Flu vaccine given    Encounter for screening mammogram for breast cancer–order given    Hypertension, benign–blood pressure controlled with carvedilol and spironolactone.     Gen

## 2021-12-08 NOTE — TELEPHONE ENCOUNTER
Please review; protocol failed.     Requested Prescriptions   Pending Prescriptions Disp Refills    SPIRONOLACTONE 50 MG Oral Tab [Pharmacy Med Name: SPIRONOLACTONE 50 MG TABLET] 90 tablet 3     Sig: TAKE 1 TABLET BY MOUTH EVERY DAY        Hypertensive Medi

## 2022-02-09 NOTE — TELEPHONE ENCOUNTER
Please review Protocol Failed/No Protocol        Requested Prescriptions   Pending Prescriptions Disp Refills    estradiol 0.1 MG/GM Vaginal Cream 42 g 3     Sig: Place 1 g vaginally twice a week.         Gynecology Medication Protocol Failed - 2/9/2022  3:51 PM        Failed - Pass dependent on manual look-up of last PAP and patient compliance with PAP follow up recommendations        Passed - Appointment in past 12 or next 3 months                  Recent Outpatient Visits              2 months ago [facility] [address] [facility]    Office Visit    9 months ago Hypertension, benign    [facility] [address] [facility]    Office Visit    9 months ago Abnormal stress test    [facility]    Office Visit    10 months ago Hypertension, benign    [facility] [address] [facility]    Office Visit    1 year ago [facility]op examination    [facility] [address] Ta[facility]ya [facility]    Office Visit

## 2022-02-09 NOTE — TELEPHONE ENCOUNTER
[facility] is requesting a 90 day supply. Last fill - 12/09/2021, 42 g, 3 refills    Please review and sign if appropriate.

## 2022-02-10 RX ORDER — ESTRADIOL 0.1 MG/G
1 CREAM VAGINAL
Qty: 42 G | Refills: 3 | Status: SHIPPED | OUTPATIENT
Start: 2022-02-10

## 2022-02-12 DIAGNOSIS — H10.10 ALLERGIC CONJUNCTIVITIS, UNSPECIFIED LATERALITY: ICD-10-CM

## 2022-02-14 RX ORDER — CARVEDILOL 6.25 MG/1
6.25 TABLET ORAL 2 TIMES DAILY WITH MEALS
Qty: 180 TABLET | Refills: 1 | Status: SHIPPED | OUTPATIENT
Start: 2022-02-14 | End: 2022-09-05

## 2022-02-14 RX ORDER — FLUTICASONE PROPIONATE 50 MCG
2 SPRAY, SUSPENSION (ML) NASAL DAILY
Qty: 48 ML | Refills: 3 | Status: SHIPPED | OUTPATIENT
Start: 2022-02-14 | End: 2023-02-25

## 2022-02-14 NOTE — TELEPHONE ENCOUNTER
Refill passed per [facility] protocol. Requested Prescriptions   Pending Prescriptions Disp Refills    CARVEDILOL 6.25 MG Oral Tab [Pharmacy Med Name: CARVEDILOL 6.25 MG TABLET] 180 tablet 3     Sig: TAKE 1 TABLET BY MOUTH TWICE A DAY WITH FOOD        Hypertensive Medications Protocol Passed - 2/12/2022  1:37 PM        Passed - CMP or BMP in past 12 months        Passed - Appointment in past 6 or next 3 months        Passed - GFR  > 50     Lab Results   Component Value Date    GFRAA 67 12/08/2021                    FLUTICASONE PROPIONATE 50 MCG/ACT Nasal Suspension [Pharmacy Med Name: FLUTICASONE PROP 50 MCG SPRAY] 48 mL 3     Sig: INSTILL 2 SPRAYS BY NASAL ROUTE DAILY.         Allergy Medication Protocol Passed - 2/12/2022  1:37 PM        Passed - Appoinment in past 12 or next 3 months              Recent Outpatient Visits              2 months ago Routine physical examination    [facility], [address], [address], MD    Office Visit    9 months ago Hypertension, benign    [facility], [address] Tawnya Petit MD    Office Visit    9 months ago Abnormal stress test    [facility] [place]    Office Visit    10 months ago Hypertension, benign    [facility], [address], [address], [place]    Office Visit    1 year ago Preop examination    [facility], [address] Tawnya Petit MD    Office Visit

## 2022-03-28 ENCOUNTER — TELEPHONE (OUTPATIENT)
Dept: FAMILY MEDICINE CLINIC | Facility: CLINIC | Age: 62
End: 2022-03-28

## 2022-03-28 NOTE — TELEPHONE ENCOUNTER
Patient requesting mammogram order faxed to [facility] where she will be having her test done.     Fax# [phone]

## 2022-03-28 NOTE — TELEPHONE ENCOUNTER
Pt called and notified that mammo order was faxed to [facility], with confirmation.  Order faxed to [phone]

## 2022-04-04 ENCOUNTER — MED REC SCAN ONLY (OUTPATIENT)
Dept: FAMILY MEDICINE CLINIC | Facility: CLINIC | Age: 62
End: 2022-04-04

## 2022-04-09 ENCOUNTER — HOSPITAL ENCOUNTER (OUTPATIENT)
Age: 62
Discharge: HOME | End: 2022-04-09
Payer: COMMERCIAL

## 2022-04-09 ENCOUNTER — NURSE TRIAGE (OUTPATIENT)
Dept: FAMILY MEDICINE CLINIC | Facility: CLINIC | Age: 62
End: 2022-04-09

## 2022-04-09 VITALS
RESPIRATION RATE: 18 BRPM | OXYGEN SATURATION: 100 % | SYSTOLIC BLOOD PRESSURE: 142 MMHG | HEART RATE: 68 BPM | DIASTOLIC BLOOD PRESSURE: 54 MMHG | TEMPERATURE: 98 F

## 2022-04-09 DIAGNOSIS — M54.41 ACUTE BILATERAL LOW BACK PAIN WITH RIGHT-SIDED SCIATICA: Primary | ICD-10-CM

## 2022-04-09 PROCEDURE — 99203 OFFICE O/P NEW LOW 30 MIN: CPT | Performed by: NURSE PRACTITIONER

## 2022-04-09 RX ORDER — METHYLPREDNISOLONE 4 MG/1
TABLET ORAL
Qty: 21 EACH | Refills: 0 | Status: SHIPPED | OUTPATIENT
Start: 2022-04-09

## 2022-04-09 NOTE — ED INITIAL ASSESSMENT (HPI)
Pt came in due to lower back pain radiating down the right leg for the past 2 weeks. Pt denies any urinary s/s. Pt denies any injury trauma or fall. Pt stated she ha sbeen trying heat pads, tylenol, motrin, pain creams, and ice pack without relief. Pt has easy non labored respirations. Pt is fully verbal and ambulatory.

## 2022-05-13 ENCOUNTER — OFFICE VISIT (OUTPATIENT)
Dept: FAMILY MEDICINE CLINIC | Facility: CLINIC | Age: 62
End: 2022-05-13
Payer: COMMERCIAL

## 2022-05-13 VITALS
DIASTOLIC BLOOD PRESSURE: 78 MMHG | TEMPERATURE: 98 F | BODY MASS INDEX: 30.21 KG/M2 | RESPIRATION RATE: 18 BRPM | HEART RATE: 92 BPM | HEIGHT: 62.5 IN | WEIGHT: 168.38 LBS | SYSTOLIC BLOOD PRESSURE: 125 MMHG

## 2022-05-13 DIAGNOSIS — M54.40 BILATERAL LOW BACK PAIN WITH SCIATICA, SCIATICA LATERALITY UNSPECIFIED, UNSPECIFIED CHRONICITY: ICD-10-CM

## 2022-05-13 DIAGNOSIS — I10 HYPERTENSION, BENIGN: Primary | ICD-10-CM

## 2022-05-13 DIAGNOSIS — M19.011 ARTHRITIS OF RIGHT SHOULDER REGION: ICD-10-CM

## 2022-05-13 PROCEDURE — 3078F DIAST BP <80 MM HG: CPT | Performed by: FAMILY MEDICINE

## 2022-05-13 PROCEDURE — 99213 OFFICE O/P EST LOW 20 MIN: CPT | Performed by: FAMILY MEDICINE

## 2022-05-13 PROCEDURE — 3074F SYST BP LT 130 MM HG: CPT | Performed by: FAMILY MEDICINE

## 2022-05-13 PROCEDURE — 3008F BODY MASS INDEX DOCD: CPT | Performed by: FAMILY MEDICINE

## 2022-05-13 RX ORDER — MELOXICAM 15 MG/1
15 TABLET ORAL DAILY
Qty: 30 TABLET | Refills: 1 | Status: SHIPPED | OUTPATIENT
Start: 2022-05-13

## 2022-05-17 ENCOUNTER — TELEPHONE (OUTPATIENT)
Dept: INTERNAL MEDICINE CLINIC | Facility: CLINIC | Age: 62
End: 2022-05-17

## 2022-07-07 RX ORDER — MELOXICAM 15 MG/1
15 TABLET ORAL DAILY
Qty: 30 TABLET | Refills: 1 | Status: SHIPPED | OUTPATIENT
Start: 2022-07-07

## 2022-08-02 ENCOUNTER — OFFICE VISIT (OUTPATIENT)
Dept: FAMILY MEDICINE CLINIC | Facility: CLINIC | Age: 62
End: 2022-08-02
Payer: COMMERCIAL

## 2022-08-02 VITALS
SYSTOLIC BLOOD PRESSURE: 136 MMHG | HEART RATE: 62 BPM | BODY MASS INDEX: 30 KG/M2 | WEIGHT: 168 LBS | DIASTOLIC BLOOD PRESSURE: 78 MMHG | TEMPERATURE: 98 F

## 2022-08-02 DIAGNOSIS — L81.1 MELASMA: ICD-10-CM

## 2022-08-02 DIAGNOSIS — I10 HYPERTENSION, BENIGN: Primary | ICD-10-CM

## 2022-08-02 DIAGNOSIS — M19.90 OSTEOARTHRITIS, UNSPECIFIED OSTEOARTHRITIS TYPE, UNSPECIFIED SITE: ICD-10-CM

## 2022-08-02 PROCEDURE — 99213 OFFICE O/P EST LOW 20 MIN: CPT | Performed by: FAMILY MEDICINE

## 2022-08-02 PROCEDURE — 3078F DIAST BP <80 MM HG: CPT | Performed by: FAMILY MEDICINE

## 2022-08-02 PROCEDURE — 3075F SYST BP GE 130 - 139MM HG: CPT | Performed by: FAMILY MEDICINE

## 2022-08-02 RX ORDER — LOSARTAN POTASSIUM AND HYDROCHLOROTHIAZIDE 12.5; 1 MG/1; MG/1
1 TABLET ORAL DAILY
Qty: 90 TABLET | Refills: 3 | Status: SHIPPED | OUTPATIENT
Start: 2022-08-02

## 2022-08-25 ENCOUNTER — TELEPHONE (OUTPATIENT)
Dept: FAMILY MEDICINE CLINIC | Facility: CLINIC | Age: 62
End: 2022-08-25

## 2022-08-25 DIAGNOSIS — I10 HYPERTENSION, BENIGN: Primary | ICD-10-CM

## 2022-08-25 NOTE — TELEPHONE ENCOUNTER
Lukas Deras,     Patient calling stating she came in on 8/2. At this time her blood pressure medication was changed due to some hormonal/menopause symptoms she was experiencing. Patient calling with an update that she has been taking the new BP medication since 8/2, but has started to experience cramping in her legs at times. Last night she states the cramping was worse than before - she drank some water and is subsided, but she wanted you to be aware. She has a follow up appointment with you on 9/8. Patient would like to know if blood work or any changes to medication is needed before this appointment.

## 2022-08-25 NOTE — TELEPHONE ENCOUNTER
Yes, please get BMP as ordered within the next week. Also recommend starting magnesium supplement 250 mg daily. Take at bedtime if muscle cramps are worse at night.

## 2022-08-25 NOTE — TELEPHONE ENCOUNTER
First Call attempt. Left Voicemail to call back our office. Office phone number provided with office hours.            Future Appointments   Date Time Provider Bhupendra Parra   9/8/2022 10:30 AM Dequan River MD [address] [place] [address]

## 2022-09-02 ENCOUNTER — LABORATORY ENCOUNTER (OUTPATIENT)
Dept: LAB | Age: 62
End: 2022-09-02
Attending: FAMILY MEDICINE
Payer: COMMERCIAL

## 2022-09-02 DIAGNOSIS — I10 HYPERTENSION, BENIGN: ICD-10-CM

## 2022-09-02 LAB
ANION GAP SERPL CALC-SCNC: 4 MMOL/L (ref 0–18)
BUN BLD-MCNC: 15 MG/DL (ref 7–18)
BUN/CREAT SERPL: 14.4 (ref 10–20)
CALCIUM BLD-MCNC: 8.8 MG/DL (ref 8.5–10.1)
CHLORIDE SERPL-SCNC: 108 MMOL/L (ref 98–112)
CO2 SERPL-SCNC: 31 MMOL/L (ref 21–32)
CREAT BLD-MCNC: 1.04 MG/DL
FASTING STATUS PATIENT QL REPORTED: YES
GFR SERPLBLD BASED ON 1.73 SQ M-ARVRAT: 62 ML/MIN/1.73M2 (ref 60–?)
GLUCOSE BLD-MCNC: 90 MG/DL (ref 70–99)
OSMOLALITY SERPL CALC.SUM OF ELEC: 296 MOSM/KG (ref 275–295)
POTASSIUM SERPL-SCNC: 4 MMOL/L (ref 3.5–5.1)
SODIUM SERPL-SCNC: 143 MMOL/L (ref 136–145)

## 2022-09-02 PROCEDURE — 80048 BASIC METABOLIC PNL TOTAL CA: CPT

## 2022-09-02 PROCEDURE — 36415 COLL VENOUS BLD VENIPUNCTURE: CPT

## 2022-09-05 RX ORDER — CARVEDILOL 6.25 MG/1
6.25 TABLET ORAL 2 TIMES DAILY WITH MEALS
Qty: 180 TABLET | Refills: 1 | Status: SHIPPED | OUTPATIENT
Start: 2022-09-05 | End: 2023-03-01

## 2022-09-05 NOTE — TELEPHONE ENCOUNTER
Refill passed per [facility] protocol.     Per last visit:   Start losartan/HCT in addition to carvedilol    Requested Prescriptions   Pending Prescriptions Disp Refills    CARVEDILOL 6.25 MG Oral Tab [Pharmacy Med Name: CARVEDILOL 6.25 MG TABLET] 180 tablet 1     Sig: TAKE 1 TABLET BY MOUTH TWICE A DAY WITH FOOD        Hypertensive Medications Protocol Passed - 9/5/2022 12:05 AM        Passed - In person appointment in the past 12 or next 3 months       Recent Outpatient Visits              1 month ago Hypertension, [facility] [address], Gretta Cordoba MD    Office Visit    3 months ago Hypertension, [facility]    [facility] Mónica Worley MD    Office Visit    9 months ago Routine physical examination    [facility] , Artie Thompson MD    Office Visit    1 year ago [facility]ension, [facility], [facility] Artie sorenson MD    Office Visit    1 year ago Abnormal stress test    [facility]    Office Visit     Future Appointments         Provider Department Appt Notes    In 3 days Mónica Worley MD [facility], [facility] BP f/u, hormones/skin discoloration               Passed - Last BP reading less than 140/90     BP Readings from Last 1 Encounters:  08/02/22 : 136/78                Passed - CMP or BMP in past 6 months     Recent Results (from the past 4392 hour(s))   BASIC METABOLIC PANEL (8)    Collection Time: 09/02/22  9:50 AM   Result Value Ref Range    Glucose 90 70 - 99 mg/dL    Sodium 143 136 - 145 mmol/L    Potassium 4.0 3.5 - 5.1 mmol/L    Chloride 108 98 - 112 mmol/L    CO2 31.0 21.0 - 32.0 mmol/L    Anion Gap 4 0 - 18 mmol/L    BUN 15 7 - 18 mg/dL    Creatinine 1.04 (H) 0.55 - 1.02 mg/dL    BUN/CREA Ratio 14.4 10.0 - 20.0    Calcium, Total 8.8 8.5 - 10.1 mg/dL    Calculated Osmolality 296 (H) 275 - 295 mOsm/kg    eGFR-Cr 62 >=60 mL/min/1.73m2    Patient Fasting for BMP? Yes      *Note: Due to a large number of results and/or encounters for the requested time period, some results have not been displayed. A complete set of results can be found in Results Review.                  Passed - In person appointment or virtual visit in the past 6 months       Recent Outpatient Visits              1 month ago Hypertension, benign    [address], [address] MD Silvino    Office Visit    3 months ago Hypertension, benign    [address], [address], [address] [facility] MD Silvino    Office Visit    9 months ago Routine physical examination    [address], [address], [address] [facility] MD Silvino    Office Visit    1 year ago Hypertension, benign    [address], [address], [address] [facility] MD Silvino    Office Visit    1 year ago Abnormal stress test    [facility]    Office Visit     Future Appointments         Provider Department Appt Notes    In 3 days Nya Gunderson MD [address] [place], [address], [place] BP f/u, hormones/skin discoloration               Passed - GFR > 50     Lab Results   Component Value Date    [facility] 09/02/2022                     Recent Outpatient Visits              1 month ago Hypertension, benign    [facility], [address], Jean Paul Thompson MD    Office Visit    3 months ago Hypertension, benign    [address], [address], [place] Nya Gunderson MD    Office Visit    9 months ago Routine physical examination    [address], [address], Jean Paul Thompson MD    Office Visit    1 year ago Hypertension, benign    [address], [address], [place] Nya Gunderson MD    Office Visit    1 year ago Abnormal stress test    [facility]    Office Visit          Future Appointments         Provider Department Appt Notes    In 3 days Nya Gunderson MD [facility], [facility], [facility] 183 BP f/u, hormones/skin discoloration

## 2022-09-12 RX ORDER — MELOXICAM 15 MG/1
15 TABLET ORAL DAILY
Qty: 30 TABLET | Refills: 1 | Status: SHIPPED | OUTPATIENT
Start: 2022-09-12

## 2022-09-12 NOTE — TELEPHONE ENCOUNTER
Refill passed per [facility] protocol. Please see pended medication for your review. Although Refill passed per [facility] protocol, it is an NSAID, requiring doctor review prior to approval per current Refill Protocol    Requested Prescriptions   Pending Prescriptions Disp Refills    MELOXICAM 15 MG Oral Tab [Pharmacy Med Name: MELOXICAM 15 MG TABLET] 30 tablet 1     Sig: TAKE 1 TABLET (15 MG TOTAL) BY MOUTH DAILY.         Non-Narcotic Pain Medication Protocol Passed - 9/12/2022 12:05 AM        Passed - In person appointment or virtual visit in the past 6 mos or appointment in next 3 mos       Recent Outpatient Visits              1 month ago Hypertension, [facility]    [facility], [address], [place]    Office Visit    4 months ago Hypertension, [facility] [employer] [facility] [facility], [address], [address] [place] [address] MD Silvino    Office Visit    9 months ago Routine physical examination    [place] [employer] [facility], [address], Suly Oglseby MD    Office Visit    1 year ago Hypertension, [facility]    [employer] [facility] [facility], [address], Suly Oglesby MD    Office Visit    1 year ago Abnormal stress test    [facility] Oropeza    Office Visit     Future Appointments         Provider Department Appt Notes    In 1 week Any Oglesby MD [employer] [facility] [facility], [address], Suly velez BP f/u, hormones/skin discoloration; Policy Informed                   Recent Outpatient Visits              1 month ago Hypertension, [facility]    [employer] [facility] [facility], [address], Suly Oglesby MD    Office Visit    4 months ago Hypertension, [facility]    [facility], Leilani Medina MD    Office Visit    9 months ago Routine physical examination    [place] [employer] [facility], [address], Suly Oglesby MD    Office Visit    1 year ago Hypertension, mandeep Martinez MD    Office Visit    1 year ago Abnormal stress test    [facility] Morrell    Office Visit          Future Appointments         Provider Department Appt Notes    In 1 week Dexter Harmon MD [address], [facility] 86, [facility] BP f/u, hormones/skin discoloration; Policy Informed

## 2022-09-19 ENCOUNTER — OFFICE VISIT (OUTPATIENT)
Dept: FAMILY MEDICINE CLINIC | Facility: CLINIC | Age: 62
End: 2022-09-19
Payer: COMMERCIAL

## 2022-09-19 VITALS
HEART RATE: 60 BPM | DIASTOLIC BLOOD PRESSURE: 76 MMHG | TEMPERATURE: 98 F | BODY MASS INDEX: 30 KG/M2 | SYSTOLIC BLOOD PRESSURE: 131 MMHG | WEIGHT: 166.81 LBS

## 2022-09-19 DIAGNOSIS — M19.90 OSTEOARTHRITIS, UNSPECIFIED OSTEOARTHRITIS TYPE, UNSPECIFIED SITE: ICD-10-CM

## 2022-09-19 DIAGNOSIS — F33.1 MAJOR DEPRESSIVE DISORDER, RECURRENT, MODERATE (HCC): ICD-10-CM

## 2022-09-19 DIAGNOSIS — K64.9 HEMORRHOIDS, UNSPECIFIED HEMORRHOID TYPE: ICD-10-CM

## 2022-09-19 DIAGNOSIS — I10 ESSENTIAL HYPERTENSION: Primary | ICD-10-CM

## 2022-09-19 PROCEDURE — 99214 OFFICE O/P EST MOD 30 MIN: CPT | Performed by: FAMILY MEDICINE

## 2022-09-19 PROCEDURE — 3078F DIAST BP <80 MM HG: CPT | Performed by: FAMILY MEDICINE

## 2022-09-19 PROCEDURE — 3075F SYST BP GE 130 - 139MM HG: CPT | Performed by: FAMILY MEDICINE

## 2022-09-19 RX ORDER — MELOXICAM 7.5 MG/1
7.5 TABLET ORAL DAILY
Qty: 90 TABLET | Refills: 3 | Status: SHIPPED | OUTPATIENT
Start: 2022-09-19

## 2022-09-19 RX ORDER — HYDROQUINONE 40 MG/G
1 CREAM TOPICAL 2 TIMES DAILY
Qty: 15 G | Refills: 1 | Status: SHIPPED | OUTPATIENT
Start: 2022-09-19

## 2022-09-19 RX ORDER — PSYLLIUM SEED (WITH DEXTROSE)
POWDER (GRAM) ORAL
Qty: 660 G | Refills: 0 | COMMUNITY
Start: 2022-09-19

## 2022-09-27 ENCOUNTER — TELEPHONE (OUTPATIENT)
Dept: FAMILY MEDICINE CLINIC | Facility: CLINIC | Age: 62
End: 2022-09-27

## 2022-09-27 NOTE — TELEPHONE ENCOUNTER
pt stated that you started her on sertraline 50 MG Oral Tab she started to take it on Monday 9/19 and she started to feel she lost her appetite could not sleep and felt horrible. She even had some diarrhea but she kept taking it until Sunday she could not take it anymore so she stopped taking the medication on Sunday. Pt wanted to let you know as she is not sure what you recommend for her. I asked pt if she wants a different medication to be prescribed she stated that she does not know as she is afraid she might get the side effects. She stated that she will follow what you recommend. Please advise  She also mentioned that she did took the metamucil on wed and Thursday and it cleaned her out. Pt is going to see the dentist today for her gums and depending what they say she will inform you. This is just FYI per pt.

## 2022-10-01 NOTE — TELEPHONE ENCOUNTER
Spoke to patient. She wanted to update Dr. Isis Deras. She went to the dentist last Tuesday and had an abscess. She was given antibiotics and now has soft stools. Relayed that she can try probiotics if she doesn't have any contraindications. She tried to find the AM/PM Menopause at the pharmacy but they didn't have it She is wondering what else she could take for her menopause/depressive symptoms because the Sertraline as not a good match for her. She does not want to take Estrogen. Dr. Isis Deras, please advise if you have other recommendations. Patient was seen in office on 9/19/22.

## 2022-10-03 NOTE — TELEPHONE ENCOUNTER
Called patient, confirmed name and . Informed of [employer] and advised to discuss with her insurance company. Assisted to schedule an appointment. She stated she needed a Saturday.     Future Appointments   Date Time Provider Bhupendra Parra   10/22/2022 11:15 AM Donna Solomon MD [address] [place] [address]

## 2022-10-03 NOTE — TELEPHONE ENCOUNTER
Please schedule to discuss. Also she may want to consider [employer] testing if she wants to see if her insurance would cover this with diagnosis major depressive disorder.

## 2022-10-22 ENCOUNTER — OFFICE VISIT (OUTPATIENT)
Dept: FAMILY MEDICINE CLINIC | Facility: CLINIC | Age: 62
End: 2022-10-22
Payer: COMMERCIAL

## 2022-10-22 VITALS
BODY MASS INDEX: 29 KG/M2 | SYSTOLIC BLOOD PRESSURE: 125 MMHG | WEIGHT: 161 LBS | HEART RATE: 80 BPM | TEMPERATURE: 98 F | DIASTOLIC BLOOD PRESSURE: 80 MMHG

## 2022-10-22 DIAGNOSIS — F41.8 DEPRESSION WITH ANXIETY: ICD-10-CM

## 2022-10-22 DIAGNOSIS — I10 ESSENTIAL HYPERTENSION: Primary | ICD-10-CM

## 2022-10-22 PROCEDURE — 90471 IMMUNIZATION ADMIN: CPT | Performed by: FAMILY MEDICINE

## 2022-10-22 PROCEDURE — 90686 IIV4 VACC NO PRSV 0.5 ML IM: CPT | Performed by: FAMILY MEDICINE

## 2022-10-22 PROCEDURE — 3074F SYST BP LT 130 MM HG: CPT | Performed by: FAMILY MEDICINE

## 2022-10-22 PROCEDURE — 99213 OFFICE O/P EST LOW 20 MIN: CPT | Performed by: FAMILY MEDICINE

## 2022-10-22 PROCEDURE — 3079F DIAST BP 80-89 MM HG: CPT | Performed by: FAMILY MEDICINE

## 2022-10-22 RX ORDER — ACETAMINOPHEN, ASPIRIN AND CAFFEINE 250; 250; 65 MG/1; MG/1; MG/1
1 TABLET, FILM COATED ORAL EVERY 6 HOURS PRN
Refills: 0 | COMMUNITY
Start: 2022-10-22

## 2022-10-22 RX ORDER — ESCITALOPRAM OXALATE 5 MG/1
5 TABLET ORAL DAILY
Qty: 90 TABLET | Refills: 1 | Status: SHIPPED | OUTPATIENT
Start: 2022-10-22

## 2023-02-25 DIAGNOSIS — H10.10 ALLERGIC CONJUNCTIVITIS, UNSPECIFIED LATERALITY: ICD-10-CM

## 2023-02-25 RX ORDER — FLUTICASONE PROPIONATE 50 MCG
2 SPRAY, SUSPENSION (ML) NASAL DAILY
Qty: 3 EACH | Refills: 1 | Status: SHIPPED | OUTPATIENT
Start: 2023-02-25

## 2023-02-25 NOTE — TELEPHONE ENCOUNTER
Refill passed per [place] [employer], [facility] protocol.       Requested Prescriptions   Pending Prescriptions Disp Refills    FLUTICASONE PROPIONATE 50 MCG/ACT Nasal Suspension [Pharmacy Med Name: FLUTICASONE PROP 50 MCG SPRAY] 48 mL 3     Sig: SPRAY 2 SPRAYS BY NASAL ROUTE DAILY       Allergy Medication Protocol Passed - 2/25/2023  8:27 AM        Passed - In person appointment or virtual visit in the past 12 mos or appointment in next 3 mos     Recent Outpatient Visits              2 months ago Depression with anxiety    Fabio Greenfield MD    Office Visit    4 months ago Essential hypertension    [address], Mykel Mares MD    Office Visit    5 months ago Essential hypertension    [address], Mykel Mares MD    Office Visit    6 months ago Hypertension, benign    [address], Mykel Mares MD    Office Visit    9 months ago Hypertension, benign    [address], Mykel Mares MD    Office Visit                                Recent Outpatient Visits              2 months ago Depression with anxiety    [address], Kip Elliott MD    Office Visit    4 months ago Essential hypertension    [address], [facility], Mykel Mares MD    Office Visit    5 months ago Essential hypertension    [address], [facility], Mykel Mares MD    Office Visit    6 months ago Hypertension, benign    [address], [facility], [facility] Jorge Faith MD    Office Visit    9 months ago Hypertension, benign    [address], [facility], Mykel Mares MD    Office Visit

## 2023-03-14 ENCOUNTER — TELEPHONE (OUTPATIENT)
Dept: FAMILY MEDICINE CLINIC | Facility: CLINIC | Age: 63
End: 2023-03-14

## 2023-03-14 RX ORDER — VALACYCLOVIR HYDROCHLORIDE 1 G/1
2000 TABLET, FILM COATED ORAL EVERY 12 HOURS SCHEDULED
Qty: 4 TABLET | Refills: 5 | Status: SHIPPED | OUTPATIENT
Start: 2023-03-14 | End: 2023-03-15

## 2023-03-14 NOTE — TELEPHONE ENCOUNTER
I received a call from pt and she stated that she has a fever blister it came out a couple days ago. Its on the right side of her lip. Pt stated that she has been using Herpecin L ointment and helping but then she ate some chips and she feels this irritated the sore. Pt stated that a few years ago you had given her Valtrex medication and it helps to get rid of it faster. Pt will like to know if you can order her this medication. Please advise.

## 2023-04-03 RX ORDER — ESCITALOPRAM OXALATE 5 MG/1
5 TABLET ORAL DAILY
Qty: 90 TABLET | Refills: 3 | Status: SHIPPED | OUTPATIENT
Start: 2023-04-03

## 2023-04-03 NOTE — TELEPHONE ENCOUNTER
Refill passed per [place] [facility], [facility] protocol. Patient due for annual physical--CSS, please assist patient with scheduling appointment. Thank you. Routing to Dr. Rodrigue Haynes for Dr. Leia Hart, Please review pended refill request as unable to refill due to high/very high interaction warning copied here:    Drug-Drug: escitalopram and Meloxicam  Toxic effects may be increased with concurrent administration of NSAIDs and Selective Serotonin Reuptake Inhibitors. The risk of upper gastrointestinal bleeding may be increased. Patients taking both drugs concurrently should be educated about the signs and symptoms of GI bleeding. Requested Prescriptions   Pending Prescriptions Disp Refills    ESCITALOPRAM 5 MG Oral Tab [Pharmacy Med Name: ESCITALOPRAM 5 MG TABLET] 90 tablet 1     Sig: Take 1 tablet (5 mg total) by mouth daily.        Psychiatric Non-Scheduled (Anti-Anxiety) Passed - 4/1/2023 12:55 PM        Passed - In person appointment or virtual visit in the past 6 mos or appointment in next 3 mos     Recent Outpatient Visits              3 months ago Depression with anxiety    An Palmer MD    Office Visit    5 months ago Essential hypertension    [address], Samia Nuno MD    Office Visit    6 months ago Essential hypertension    [address], Samia Nuno MD    Office Visit    8 months ago Hypertension, benign    [address], [facility] [place], MD    Office Visit    10 months ago Hypertension, benign    An Palmer MD    Office Visit                           Recent Outpatient Visits              3 months ago Depression with anxiety    An Palmer MD    Office Visit    5 months ago Essential hypertension    [employer] Jaqueline Rowe MD    Office Visit    6 months ago Essential hypertension    [address], [address], [facility] Maria Guadalupe Stanford MD    Office Visit    8 months ago Hypertension, benign    [address], [address], [facility] Maria Guadalupe Stanford MD    Office Visit    10 months ago Hypertension, benign    [address], [address], [facility] Maria Guadalupe Stanford MD    Office Visit

## 2023-05-09 ENCOUNTER — TELEPHONE (OUTPATIENT)
Dept: FAMILY MEDICINE CLINIC | Facility: CLINIC | Age: 63
End: 2023-05-09

## 2023-06-05 RX ORDER — LOSARTAN POTASSIUM AND HYDROCHLOROTHIAZIDE 12.5; 1 MG/1; MG/1
TABLET ORAL
Qty: 90 TABLET | Refills: 3 | OUTPATIENT
Start: 2023-06-05

## 2023-06-06 RX ORDER — LOSARTAN POTASSIUM AND HYDROCHLOROTHIAZIDE 12.5; 1 MG/1; MG/1
TABLET ORAL
Qty: 90 TABLET | Refills: 3 | Status: SHIPPED | OUTPATIENT
Start: 2023-06-06

## 2023-06-06 NOTE — TELEPHONE ENCOUNTER
Please review. Protocol failed/ No protocol. Requested Prescriptions   Pending Prescriptions Disp Refills    LOSARTAN POTASSIUM-HCTZ 100-12.5 MG Oral Tab [Pharmacy Med Name: Elham Meyer 100-12.5 MG TAB] 90 tablet 3     Sig: TAKE 1 TABLET BY MOUTH EVERY DAY       Hypertensive Medications Protocol Failed - 6/5/2023 11:43 AM        Failed - CMP or BMP in past 6 months     No results found for this or any previous visit (from the past 4392 hour(s)).               Failed - In person appointment or virtual visit in the past 6 months     Recent Outpatient Visits              6 months ago Depression with anxiety    [facility], Francesca Salgado, [facility] Ning Delcid MD    Office Visit    7 months ago Essential hypertension    [address] [place] [address] Ning Delcid MD    Office Visit    8 months ago Essential hypertension    [address] [place] [address] Ning Delcid MD    Office Visit    10 months ago [facility]pertension, benign    [facility]    Office Visit    1 year ago Hypertension, [facility], Francesca Salgado, Sami Rasmussen MD    Office Visit          Future Appointments         Provider Department Appt Notes    In 1 week Ning Delcid MD [address] [place] [address] - In person appointment in the past 12 or next 3 months     Recent Outpatient Visits              6 months ago Depression with anxiety    Celina Crews MD    Office Visit    7 months ago Essential hypertension    [address], Francesca Salgado, Sami Rasmussen MD    Office Visit    8 months ago Essential hypertension    [address], Francesca Salgado, Sami Rasmussen MD    Office Visit    10 months ago Hypertension, benign    [facility], Francesca Salgado, Suly Sterling MD    Office Visit    1 year ago Hypertension, benign    [facility], Francesca Salgado, Suly Sterling MD    Office Visit          Future Appointments         Provider Department Appt Notes    In 1 week Juliette Sterling MD [address], [facility] BP reading less than 140/90     BP Readings from Last 1 Encounters:  12/06/22 : 125/74                Passed - EGFRCR or GFRAA > 50     GFR Evaluation  EGFRCR: 62 , resulted on 9/2/2022                 Recent Outpatient Visits              6 months ago Depression with anxiety    [facility], Francesca Salgado, Suly Sterling MD    Office Visit    7 months ago Essential hypertension    Francesca Glover, Suly Sterling MD    Office Visit    8 months ago Essential hypertension    Francesca Glover, Suly Sterling MD    Office Visit    10 months ago Hypertension, benign    [address], Suly Sterling MD    Office Visit    1 year ago Hypertension, benign    [address], Mica Burroughs MD    Office Visit            Future Appointments         Provider Department Appt Notes    In 1 week Juliette Sterling MD [address], Suly velez

## 2023-06-13 ENCOUNTER — LAB ENCOUNTER (OUTPATIENT)
Dept: LAB | Age: 63
End: 2023-06-13
Attending: FAMILY MEDICINE
Payer: COMMERCIAL

## 2023-06-13 ENCOUNTER — OFFICE VISIT (OUTPATIENT)
Dept: FAMILY MEDICINE CLINIC | Facility: CLINIC | Age: 63
End: 2023-06-13

## 2023-06-13 VITALS
TEMPERATURE: 98 F | HEIGHT: 63 IN | SYSTOLIC BLOOD PRESSURE: 120 MMHG | HEART RATE: 60 BPM | DIASTOLIC BLOOD PRESSURE: 71 MMHG | WEIGHT: 172.38 LBS | BODY MASS INDEX: 30.54 KG/M2

## 2023-06-13 DIAGNOSIS — I10 HYPERTENSION, BENIGN: ICD-10-CM

## 2023-06-13 DIAGNOSIS — F32.A DEPRESSIVE DISORDER: ICD-10-CM

## 2023-06-13 DIAGNOSIS — Z12.31 BREAST CANCER SCREENING BY MAMMOGRAM: ICD-10-CM

## 2023-06-13 DIAGNOSIS — Z98.890 HISTORY OF COLONOSCOPY: ICD-10-CM

## 2023-06-13 DIAGNOSIS — Z01.419 ENCOUNTER FOR GYNECOLOGICAL EXAMINATION WITHOUT ABNORMAL FINDING: Primary | ICD-10-CM

## 2023-06-13 DIAGNOSIS — Z00.00 ENCOUNTER FOR ANNUAL PHYSICAL EXAM: ICD-10-CM

## 2023-06-13 DIAGNOSIS — Z12.11 COLON CANCER SCREENING: ICD-10-CM

## 2023-06-13 DIAGNOSIS — Z01.419 ENCOUNTER FOR GYNECOLOGICAL EXAMINATION WITHOUT ABNORMAL FINDING: ICD-10-CM

## 2023-06-13 DIAGNOSIS — D64.9 NORMOCYTIC ANEMIA: ICD-10-CM

## 2023-06-13 LAB
ANION GAP SERPL CALC-SCNC: 6 MMOL/L (ref 0–18)
BUN BLD-MCNC: 23 MG/DL (ref 7–18)
BUN/CREAT SERPL: 25 (ref 10–20)
CALCIUM BLD-MCNC: 8.8 MG/DL (ref 8.5–10.1)
CHLORIDE SERPL-SCNC: 107 MMOL/L (ref 98–112)
CHOLEST SERPL-MCNC: 209 MG/DL (ref ?–200)
CO2 SERPL-SCNC: 29 MMOL/L (ref 21–32)
CREAT BLD-MCNC: 0.92 MG/DL
DEPRECATED RDW RBC AUTO: 43 FL (ref 35.1–46.3)
ERYTHROCYTE [DISTWIDTH] IN BLOOD BY AUTOMATED COUNT: 12.5 % (ref 11–15)
FASTING PATIENT LIPID ANSWER: NO
FASTING STATUS PATIENT QL REPORTED: NO
GFR SERPLBLD BASED ON 1.73 SQ M-ARVRAT: 71 ML/MIN/1.73M2 (ref 60–?)
GLUCOSE BLD-MCNC: 87 MG/DL (ref 70–99)
HCT VFR BLD AUTO: 35.2 %
HDLC SERPL-MCNC: 66 MG/DL (ref 40–59)
HGB BLD-MCNC: 11.1 G/DL
LDLC SERPL CALC-MCNC: 124 MG/DL (ref ?–100)
MCH RBC QN AUTO: 29.5 PG (ref 26–34)
MCHC RBC AUTO-ENTMCNC: 31.5 G/DL (ref 31–37)
MCV RBC AUTO: 93.6 FL
NONHDLC SERPL-MCNC: 143 MG/DL (ref ?–130)
OSMOLALITY SERPL CALC.SUM OF ELEC: 297 MOSM/KG (ref 275–295)
PLATELET # BLD AUTO: 312 10(3)UL (ref 150–450)
POTASSIUM SERPL-SCNC: 3.8 MMOL/L (ref 3.5–5.1)
RBC # BLD AUTO: 3.76 X10(6)UL
SODIUM SERPL-SCNC: 142 MMOL/L (ref 136–145)
TRIGL SERPL-MCNC: 106 MG/DL (ref 30–149)
VLDLC SERPL CALC-MCNC: 19 MG/DL (ref 0–30)
WBC # BLD AUTO: 8.3 X10(3) UL (ref 4–11)

## 2023-06-13 PROCEDURE — 3074F SYST BP LT 130 MM HG: CPT | Performed by: FAMILY MEDICINE

## 2023-06-13 PROCEDURE — 3008F BODY MASS INDEX DOCD: CPT | Performed by: FAMILY MEDICINE

## 2023-06-13 PROCEDURE — 85027 COMPLETE CBC AUTOMATED: CPT

## 2023-06-13 PROCEDURE — 83540 ASSAY OF IRON: CPT

## 2023-06-13 PROCEDURE — 80048 BASIC METABOLIC PNL TOTAL CA: CPT

## 2023-06-13 PROCEDURE — 82728 ASSAY OF FERRITIN: CPT

## 2023-06-13 PROCEDURE — 80061 LIPID PANEL: CPT

## 2023-06-13 PROCEDURE — 99396 PREV VISIT EST AGE 40-64: CPT | Performed by: FAMILY MEDICINE

## 2023-06-13 PROCEDURE — 3078F DIAST BP <80 MM HG: CPT | Performed by: FAMILY MEDICINE

## 2023-06-13 PROCEDURE — 84466 ASSAY OF TRANSFERRIN: CPT

## 2023-06-13 PROCEDURE — 36415 COLL VENOUS BLD VENIPUNCTURE: CPT

## 2023-06-14 DIAGNOSIS — D64.9 NORMOCYTIC ANEMIA: Primary | ICD-10-CM

## 2023-06-14 LAB
DEPRECATED HBV CORE AB SER IA-ACNC: 107.2 NG/ML
IRON SATN MFR SERPL: 13 %
IRON SERPL-MCNC: 50 UG/DL
TIBC SERPL-MCNC: 399 UG/DL (ref 240–450)
TRANSFERRIN SERPL-MCNC: 268 MG/DL (ref 200–360)

## 2023-06-22 LAB — HPV I/H RISK 1 DNA SPEC QL NAA+PROBE: NEGATIVE

## 2023-06-23 ENCOUNTER — NURSE TRIAGE (OUTPATIENT)
Dept: FAMILY MEDICINE CLINIC | Facility: CLINIC | Age: 63
End: 2023-06-23

## 2023-06-23 ENCOUNTER — OFFICE VISIT (OUTPATIENT)
Dept: FAMILY MEDICINE CLINIC | Facility: CLINIC | Age: 63
End: 2023-06-23

## 2023-06-23 VITALS
SYSTOLIC BLOOD PRESSURE: 129 MMHG | BODY MASS INDEX: 30 KG/M2 | HEART RATE: 73 BPM | TEMPERATURE: 98 F | WEIGHT: 170.13 LBS | DIASTOLIC BLOOD PRESSURE: 77 MMHG

## 2023-06-23 DIAGNOSIS — N90.7 INCLUSION CYST OF VULVA: Primary | ICD-10-CM

## 2023-06-23 PROCEDURE — 3074F SYST BP LT 130 MM HG: CPT | Performed by: FAMILY MEDICINE

## 2023-06-23 PROCEDURE — 99213 OFFICE O/P EST LOW 20 MIN: CPT | Performed by: FAMILY MEDICINE

## 2023-06-23 PROCEDURE — 3078F DIAST BP <80 MM HG: CPT | Performed by: FAMILY MEDICINE

## 2023-06-23 NOTE — TELEPHONE ENCOUNTER
RN called patient Patient's date of birth and full name both confirmed. RN informed of provider's message as detailed below. And that appointment has been made. Encouraged her to arrive at 2:15pm in case previous appointment ends sooner.      Future Appointments   Date Time Provider Bhupendra Parra   6/23/2023  2:30 PM Norman Santana MD [address] [place] [address]   7/11/2023  3:00 PM GI COLON SCREENING [facility] None Warm

## 2023-07-11 ENCOUNTER — NURSE ONLY (OUTPATIENT)
Facility: CLINIC | Age: 63
End: 2023-07-11

## 2023-07-11 DIAGNOSIS — Z12.11 COLON CANCER SCREENING: Primary | ICD-10-CM

## 2023-07-11 RX ORDER — SODIUM, POTASSIUM,MAG SULFATES 17.5-3.13G
SOLUTION, RECONSTITUTED, ORAL ORAL
Qty: 1 EACH | Refills: 0 | Status: SHIPPED | OUTPATIENT
Start: 2023-07-11

## 2023-07-11 NOTE — PROGRESS NOTES
Patient:   Devonte Wiggins #:   [id number]                    Room: [facility]  Amanuel MOSELEY #:  [id number]     ADMITTED:   12/14/2013        OPERATIVE REPORT     DATE OF OPERATION:  12/14/13     PREOPERATIVE DIAGNOSIS:  Average risk screening colonoscopy   examination. POSTOPERATIVE DIAGNOSIS:  Internal hemorrhoids. PROCEDURE:  Colonoscopy. SURGEON:  Perfecto Maldonado MD     SEDATION:   Fentanyl 75 mcg and midazolam 7 mg intravenously   before and  throughout the procedure. COLONOSCOPY PROCEDURE:  After the nature and risks of colonoscopy  examination under conscious sedation were discussed with Ms. Marcos Hernandez and her  questions answered, her informed consent was obtained. She was   sedated as  above. Digital rectal exam was performed which showed no masses. The  Olympus pediatric video colonoscope was placed in the patient's   rectum and  advanced under direct visualization through the entire length of   the colon  up to the cecum and briefly into the terminal ileum. Cecum was   confirmed  by landmarks including appendiceal orifice, cecal strap, ileocecal   valve. The quality of the prep was good. Retroflexion was performed in   the  rectum. Colonoscopy findings: The cecum, ileocecal valve, ascending colon,   hepatic  flexure, transverse colon, splenic flexure, descending, sigmoid   and rectum  were entirely normal today. Fairly tortuous sigmoid with   significant  looping today. Retroflexion in the rectum showed small internal  hemorrhoids. The scope was straightened, air suctioned out, and   the scope  withdrawn from the patient. She tolerated the procedures well   with the  sedation given. IMPRESSION:  1. Internal hemorrhoids only on screening colonoscopy examination. RECOMMENDATIONS:  1. High fiber diet. Repeat colonoscopy examination in 7-10 years or as clinically   indicated. D:    12/14/201311:26 A  T:    12/14/2013  5:33 P  ATTENDING:  Maday Sprinkles A. Nereida Colon MD  DICTATING:    Quentin . Nereida Colon MD MD ID #:      [id number]  cc:   Quentin . MD Rene Granados Leif J[address] [place] [address]        Ade Godoy MD        JOB NUMBER:         [id number]  [facility] #: [id number]. [facility]  MR #: [id number]                  Patient: Edie Robles

## 2023-07-19 NOTE — PROGRESS NOTES
Scheduled for:  Colonoscopy [id number]  Provider Name:  Dr. Janelle Diallo  Date:  12/28/23  Location:  [facility]  Sedation:  Mac  Time:  8:15 am (pt is aware that [facility] will call the day before to confirm arrival time)    Prep:  Golytely  Meds/Allergies Reconciled?:  Physician reviewed      Diagnosis with codes:  colon screening Z12.11  Was patient informed to call insurance with codes (Y/N): Yes     Referral sent?:  Referral was sent at the time of electronic surgical scheduling. [address] [facility] or [address] [place] [address] notified?:  I sent an electronic request to [address] [place] [address] and received a confirmation today. Medication Orders:  Pt is aware to NOT take iron pills, herbal meds and diet supplements for 7 days before exam. Also to NOT take any form of alcohol, recreational drugs and any forms of ED meds 24 hours before exam.     Misc Orders:       Further instructions given by staff:  I discussed prep instructions with the patient at the time of the appointment which she  verbally understood and given the prep instructions at the time of the appointment. Patient was informed about the new cancellation policy for his/her procedure. Patient was also given a copy of the cancellation policy at the time of the appointment and verbalized understanding.

## 2023-08-12 ENCOUNTER — HOSPITAL ENCOUNTER (OUTPATIENT)
Dept: MAMMOGRAPHY | Age: 63
Discharge: HOME | End: 2023-08-12
Attending: FAMILY MEDICINE
Payer: COMMERCIAL

## 2023-08-12 DIAGNOSIS — Z12.31 BREAST CANCER SCREENING BY MAMMOGRAM: ICD-10-CM

## 2023-08-12 PROCEDURE — 77063 BREAST TOMOSYNTHESIS BI: CPT | Performed by: FAMILY MEDICINE

## 2023-08-12 PROCEDURE — 77067 SCR MAMMO BI INCL CAD: CPT | Performed by: FAMILY MEDICINE

## 2023-08-25 RX ORDER — MELOXICAM 7.5 MG/1
7.5 TABLET ORAL DAILY
Qty: 90 TABLET | Refills: 3 | Status: SHIPPED | OUTPATIENT
Start: 2023-08-25

## 2023-08-25 NOTE — TELEPHONE ENCOUNTER
Routed to Dr Ariana Saldaña for advise, thanks. There is a high alert notification that I am not permitted to override. Drug-Drug Interaction Report    Selective Serotonin Reuptake Inhibitors / NSAIDs     Significance: Major     Warning: Toxic effects may be increased with concurrent administration of Meloxicam and escitalopram. The risk of upper gastrointestinal bleeding may be increased. Patients taking both drugs concurrently should be educated about the signs and symptoms of GI bleeding.        Refill passed per [facility] protocol   Requested Prescriptions   Pending Prescriptions Disp Refills    MELOXICAM 7.5 MG Oral Tab [Pharmacy Med Name: MELOXICAM 7.5 MG TABLET] 90 tablet 3     Sig: TAKE 1 TABLET BY MOUTH EVERY DAY       Non-Narcotic Pain Medication Protocol Passed - 8/25/2023 10:16 AM        Passed - In person appointment or virtual visit in the past 6 mos or appointment in next 3 mos     Recent Outpatient Visits              1 month ago Colon cancer screening    [facility]clean, [address], [place]    Nurse Only    2 months ago Inclusion cyst of vulva    Francesca Wright, Sami Rasmussen MD    Office Visit    2 months ago Encounter for gynecological examination without abnormal finding    [facility] Wright, Sami Rasmussen MD    Office Visit    8 months ago Depression with anxiety    Carl Wright MD    Office Visit    10 months ago Essential hypertension    [facility] Wright, Suly Delcid MD    Office Visit          Future Appointments         Provider Department Appt Notes    In 4 months [address], [address], [place] colonoscopy @ [address] [place] [address]

## 2023-08-27 DIAGNOSIS — H10.10 ALLERGIC CONJUNCTIVITIS, UNSPECIFIED LATERALITY: ICD-10-CM

## 2023-08-28 RX ORDER — FLUTICASONE PROPIONATE 50 MCG
2 SPRAY, SUSPENSION (ML) NASAL DAILY
Qty: 48 ML | Refills: 3 | Status: SHIPPED | OUTPATIENT
Start: 2023-08-28

## 2023-08-28 NOTE — TELEPHONE ENCOUNTER
Refill passed per [place], [facility] protocol    Requested Prescriptions   Pending Prescriptions Disp Refills    fluticasone propionate 50 MCG/ACT Nasal Suspension [Pharmacy Med Name: FLUTICASONE PROP 50 MCG SPRAY] 48 mL 3     Sig: SPRAY 2 SPRAYS BY NASAL ROUTE DAILY       Allergy Medication Protocol Passed - 8/27/2023  7:15 AM        Passed - In person appointment or virtual visit in the past 12 mos or appointment in next 3 mos     Recent Outpatient Visits              1 month ago Colon cancer screening    [address], [address], [place]    Nurse Only    2 months ago Inclusion cyst of vulva    [address], Kip Elliott MD    Office Visit    2 months ago Encounter for gynecological examination without abnormal finding    [address], Francesca Salgado, Mykel Mares MD    Office Visit    8 months ago Depression with anxiety    [address], Kip Elliott MD    Office Visit    10 months ago Essential hypertension    [address], Francesca Salgado, Mykel Mares MD    Office Visit          Future Appointments         Provider Department Appt Notes    In 4 months [address] [place] [address] Drive, [address] [facility], [address], [place] colonoscopy @ [address] [place] [address]

## 2023-10-19 ENCOUNTER — IMMUNIZATION (OUTPATIENT)
Dept: FAMILY MEDICINE CLINIC | Facility: CLINIC | Age: 63
End: 2023-10-19

## 2023-10-19 DIAGNOSIS — Z23 NEED FOR VACCINATION: Primary | ICD-10-CM

## 2023-10-19 PROCEDURE — 90471 IMMUNIZATION ADMIN: CPT | Performed by: FAMILY MEDICINE

## 2023-10-19 PROCEDURE — 90686 IIV4 VACC NO PRSV 0.5 ML IM: CPT | Performed by: FAMILY MEDICINE

## 2023-12-06 ENCOUNTER — TELEPHONE (OUTPATIENT)
Dept: FAMILY MEDICINE CLINIC | Facility: CLINIC | Age: 63
End: 2023-12-06

## 2023-12-06 NOTE — TELEPHONE ENCOUNTER
Patient called and she wants to know if medication can be increased for her arthritis, she booked an appointment for Jan 4 to check arthritis but patient does not want to wait that long. Patient feels stiffness. Mentioned medication was lowered before; Please advise.

## 2023-12-06 NOTE — TELEPHONE ENCOUNTER
Patient called (identified name and ),   Patient made new appointment for 2023. Arthritis in shoulders, knees and back is getting worse. She is on meloxicam 7.5 mg.  Used to be on 15 mg. Asking Dr Hedy Swain if she can double up and use 15 mg until she comes in next week? Dr Rani Humphries advise?         Future Appointments   Date Time Provider Bhupendra Tracyi   2023 10:30 AM Aidan Michaud MD [address] [place] [address]   2023  8:15 AM TERRY, PROCEDURE [facility] None

## 2023-12-07 NOTE — TELEPHONE ENCOUNTER
Called patient, confirmed name and . Patient advised of Dr. Elisabeth Eugene recommendation. Patient verbalized understanding.

## 2023-12-13 ENCOUNTER — OFFICE VISIT (OUTPATIENT)
Dept: FAMILY MEDICINE CLINIC | Facility: CLINIC | Age: 63
End: 2023-12-13

## 2023-12-13 VITALS
OXYGEN SATURATION: 98 % | WEIGHT: 176 LBS | BODY MASS INDEX: 31.18 KG/M2 | HEIGHT: 63 IN | SYSTOLIC BLOOD PRESSURE: 122 MMHG | HEART RATE: 72 BPM | DIASTOLIC BLOOD PRESSURE: 70 MMHG

## 2023-12-13 DIAGNOSIS — M54.50 CHRONIC BILATERAL LOW BACK PAIN WITHOUT SCIATICA: ICD-10-CM

## 2023-12-13 DIAGNOSIS — M19.90 OSTEOARTHRITIS, UNSPECIFIED OSTEOARTHRITIS TYPE, UNSPECIFIED SITE: Primary | ICD-10-CM

## 2023-12-13 DIAGNOSIS — G89.29 CHRONIC BILATERAL LOW BACK PAIN WITHOUT SCIATICA: ICD-10-CM

## 2023-12-13 DIAGNOSIS — I10 HYPERTENSION, BENIGN: ICD-10-CM

## 2023-12-13 DIAGNOSIS — N62 MACROMASTIA: ICD-10-CM

## 2023-12-13 PROCEDURE — 99214 OFFICE O/P EST MOD 30 MIN: CPT | Performed by: FAMILY MEDICINE

## 2023-12-13 PROCEDURE — 3078F DIAST BP <80 MM HG: CPT | Performed by: FAMILY MEDICINE

## 2023-12-13 PROCEDURE — 3008F BODY MASS INDEX DOCD: CPT | Performed by: FAMILY MEDICINE

## 2023-12-13 PROCEDURE — 3074F SYST BP LT 130 MM HG: CPT | Performed by: FAMILY MEDICINE

## 2023-12-13 RX ORDER — MELOXICAM 7.5 MG/1
TABLET ORAL
Qty: 180 TABLET | Refills: 1 | Status: SHIPPED | OUTPATIENT
Start: 2023-12-13

## 2023-12-13 RX ORDER — VALACYCLOVIR HYDROCHLORIDE 1 G/1
2000 TABLET, FILM COATED ORAL EVERY 12 HOURS
COMMUNITY
Start: 2023-11-20

## 2023-12-13 NOTE — PROGRESS NOTES
Subjective:   Patient ID: Ruthy Pérez is a 59year old female. Patient presents with back pain, shoulder pain, and follow-up on hypertension as below. History/Other:   Review of Systems  Current Outpatient Medications   Medication Sig Dispense Refill    valACYclovir 1 G Oral Tab Take 2 tablets (2,000 mg total) by mouth Q12H. Meloxicam 7.5 MG Oral Tab Take 1-2 tablets by mouth daily 180 tablet 1    fluticasone propionate 50 MCG/ACT Nasal Suspension 2 sprays by Nasal route daily. 48 mL 3    LOSARTAN POTASSIUM-HCTZ 100-12.5 MG Oral Tab TAKE 1 TABLET BY MOUTH EVERY DAY 90 tablet 3    escitalopram 5 MG Oral Tab Take 1 tablet (5 mg total) by mouth daily. 90 tablet 3    CARVEDILOL 6.25 MG Oral Tab TAKE 1 TABLET BY MOUTH TWICE A DAY WITH FOOD 180 tablet 3    Psyllium (METAMUCIL) 28.3 % Oral Powder 1 tablespoon dissolved in large glass of water taken by mouth once a day 660 g 0    Hydroquinone 4 % External Cream Apply 1 Application topically 2 (two) times daily. 15 g 1    Misc Natural Products (AM/PM PERIMENOPAUSE FORMULA) Oral Tab As directed 90 tablet 0    Multiple Vitamins-Minerals (EMERGEN-C IMMUNE OR) Take by mouth. Na Sulfate-K Sulfate-Mg Sulf (SUPREP BOWEL PREP KIT) 17.5-3.13-1.6 GM/177ML Oral Solution Take as directed 1 each 0     Allergies:No Known Allergies    Objective:   Physical Exam  Constitutional:       Appearance: Normal appearance. Cardiovascular:      Rate and Rhythm: Normal rate and regular rhythm. Pulses: Normal pulses. Heart sounds: Normal heart sounds. Pulmonary:      Effort: Pulmonary effort is normal.      Breath sounds: Normal breath sounds. Comments: Macromastia with shoulder Ruts from bra strap  Musculoskeletal:      Right lower leg: No edema. Left lower leg: No edema. Neurological:      Mental Status: She is alert. Assessment & Plan:   1.  Osteoarthritis, unspecified osteoarthritis type, unspecified site-history of osteoarthritis multiple sites.  She has used meloxicam 7.5 mg daily and 15 mg daily. 50 mg definitely more effective. Last GFR normal, blood pressure is controlled. New Rx sent to pharmacy for meloxicam 7.5 mg 1 to 2 tablets daily. 2. Chronic bilateral low back pain without sciatica-upper and lower back pain. Suspect associated with macromastia. Bra 38 triple D. Referred for physical therapy. Recommend plastic surgery consultation. 3. Macromastia-as above   4. Hypertension, benign-blood pressure well-controlled on current medication       No orders of the defined types were placed in this encounter.       Meds This Visit:  Requested Prescriptions     Signed Prescriptions Disp Refills    Meloxicam 7.5 MG Oral Tab 180 tablet 1     Sig: Take 1-2 tablets by mouth daily       Imaging & Referrals:  PLASTIC SURGERY - INTERNAL  PHYSICAL THERAPY - INTERNAL

## 2023-12-27 ENCOUNTER — TELEPHONE (OUTPATIENT)
Facility: CLINIC | Age: 63
End: 2023-12-27

## 2023-12-27 NOTE — TELEPHONE ENCOUNTER
I spoke to the pt and we reviewed her bowel prep instructions for the Suprep in detail over the phone    All of her questions were answered    She is aware I resent the instructions to her MyChart      She is aware she will be receiving a call from [facility] today with her procedure time & arrival time

## 2024-02-20 RX ORDER — CARVEDILOL 6.25 MG/1
6.25 TABLET ORAL 2 TIMES DAILY WITH MEALS
Qty: 180 TABLET | Refills: 1 | Status: SHIPPED | OUTPATIENT
Start: 2024-02-20

## 2024-02-20 NOTE — TELEPHONE ENCOUNTER
Refill passed per [facility] protocol.    Requested Prescriptions   Pending Prescriptions Disp Refills    CARVEDILOL 6.25 MG Oral Tab [Pharmacy Med Name: CARVEDILOL 6.25 MG TABLET] 180 tablet 3     Sig: TAKE 1 TABLET BY MOUTH TWICE A DAY WITH FOOD       Hypertension Medications Protocol Passed - 2/19/2024 12:03 AM        Passed - CMP or BMP in past 12 months        Passed - Last BP reading less than 140/90     BP Readings from Last 1 Encounters:   12/28/23 120/78               Passed - In person appointment or virtual visit in the past 12 mos or appointment in next 3 mos     Recent Outpatient Visits              2 months ago Osteoarthritis, unspecified osteoarthritis type, unspecified site    [facility] [place] Solis [facility]    Office Visit    7 months ago Colon cancer screening    [facility]    Nurse Only    8 months ago Inclusion cyst of vulva    [facility] [place] Solis [facility]    Office Visit    8 months ago Encounter for gynecological examination without abnormal finding    [facility] [place] Solis MD    Office Visit    1 year ago Depression with anxiety    [facility] [place] Desiree Ruelas MD    Office Visit                      Passed - EGFRCR or GFRAA > 50     GFR Evaluation  EGFRCR: 71 , resulted on 6/13/2023

## 2024-04-05 RX ORDER — ESCITALOPRAM OXALATE 5 MG/1
5 TABLET ORAL DAILY
Qty: 90 TABLET | Refills: 3 | Status: SHIPPED | OUTPATIENT
Start: 2024-04-05

## 2024-04-05 NOTE — TELEPHONE ENCOUNTER
REFILL PASSED PER [facility] PROTOCOLS    Requested Prescriptions   Pending Prescriptions Disp Refills    ESCITALOPRAM 5 MG Oral Tab [Pharmacy Med Name: ESCITALOPRAM 5 MG TABLET] 90 tablet 3     Sig: TAKE 1 TABLET (5 MG TOTAL) BY MOUTH DAILY.       Psychiatric Non-Scheduled (Anti-Anxiety) Passed - 4/5/2024 12:36 AM        Passed - In person appointment or virtual visit in the past 6 mos or appointment in next 3 mos     Recent Outpatient Visits              3 months ago Osteoarthritis, unspecified osteoarthritis type, unspecified site    [facility] Desiree Ruelas MD    Office Visit    8 months ago [facility]    9 months ago Inclusion cyst of vulva    [facility] [place] Desiree Ruelas MD    Office Visit    9 months ago Encounter for gynecological examination without abnormal finding    [facility] Desiree Ruelas MD    Office Visit    1 year ago Depression with anxiety    [facility] Desiree Ruelas MD    Office Visit          Future Appointments         Provider Department Appt Notes    In 2 months Desiree Ruelas MD [facility] 03/20 1st mychart msg to reschedule-BA  px, last px 6/13/2023               Passed - Depression Screening completed within the past 12 months             Future Appointments         Provider Department Appt Notes    In 2 months Desiree Ruelas MD [facility] 03/20 1st mychart msg to reschedule-BA  px, last px 6/13/2023          Recent Outpatient Visits              3 months ago Osteoarthritis, unspecified osteoarthritis type, unspecified site    [facility] Desiree Ruelas MD    Office Visit    8 months ago Colon cancer screening     [facility]    [facility]urse Only    9 months ago Inclusion cyst of vulva    [facility] [place] [facility] Mary C, MD    Office Visit    9 months ago Encounter for gynecological examination without abnormal finding    [facility]    Office Visit    1 year ago Depression with anxiety    [facility] Mary C, MD    Office Visit

## 2024-04-05 NOTE — TELEPHONE ENCOUNTER
Please review pended refill request as unable to refill due to high/very high drug interaction warning copied here;     High  Drug-Drug: escitalopram and MeloxicamToxic effects may be increased with concurrent administration of NSAIDs and Selective Serotonin Reuptake Inhibitors. The risk of upper gastrointestinal bleeding may be increased. Patients taking both drugs concurrently should be educated about the signs and symptoms of GI bleeding.  Details  Requested Prescriptions   Pending Prescriptions Disp Refills    escitalopram 5 MG Oral Tab [Pharmacy Med Name: ESCITALOPRAM 5 MG TABLET] 90 tablet 3     Sig: Take 1 tablet (5 mg total) by mouth daily.       Psychiatric Non-Scheduled (Anti-Anxiety) Passed - 4/5/2024 12:36 AM        Passed - In person appointment or virtual visit in the past 6 mos or appointment in next 3 mos     Recent Outpatient Visits              3 months ago Osteoarthritis, unspecified osteoarthritis type, unspecified site    [facility] Desiree Ruelas MD    Office Visit    8 months ago Colon cancer screening    [facility] [place]    Nurse Only    9 months ago Inclusion cyst of vulva    [facility] Desiree Ruelas MD    Office Visit    9 months ago Encounter for gynecological examination without abnormal finding    [facility] Desiree Ruelas MD    Office Visit    1 year ago Depression with anxiety    [facility] Desiree Ruelas MD    Office Visit          Future Appointments         Provider Department Appt Notes    In 2 months Desiree Ruelas MD [facility] 03/20 1st mychart msg to reschedule-BA  px, last px 6/13/2023               Passed - Depression Screening completed within the past 12 months             Future Appointments         Provider Department Appt  Notes    In 2 months Desiree Ruelas MD [facility] 03/20 1st mychart msg to reschedule-BA  px, last px 6/13/2023          Recent Outpatient Visits              3 months ago Osteoarthritis, unspecified osteoarthritis type, unspecified site    [facility] Desiree Ruelas MD    Office Visit    8 months ago Colon cancer screening    [facility]    Nurse Only    9 months ago Inclusion cyst of vulva    [facility] Desiree Ruelas MD    Office Visit    9 months ago Encounter for gynecological examination without abnormal finding    [facility] Desiree Ruelas MD    Office Visit    1 year ago Depression with anxiety    [facility] Desiree Ruelas MD    Office Visit

## 2024-05-19 RX ORDER — LOSARTAN POTASSIUM AND HYDROCHLOROTHIAZIDE 12.5; 1 MG/1; MG/1
1 TABLET ORAL DAILY
Qty: 90 TABLET | Refills: 3 | Status: SHIPPED | OUTPATIENT
Start: 2024-05-19

## 2024-05-19 NOTE — TELEPHONE ENCOUNTER
[facility]    Requested Prescriptions   Pending Prescriptions Disp Refills    LOSARTAN POTASSIUM-HCTZ 100-12.5 MG Oral Tab [Pharmacy Med Name: LOSARTAN-HCTZ 100-12.5 MG TAB] 90 tablet 3     Sig: TAKE 1 TABLET BY MOUTH EVERY DAY       Hypertension Medications Protocol Passed - 5/18/2024  7:18 AM        Passed - CMP or BMP in past 12 months        Passed - Last BP reading less than 140/90     BP Readings from Last 1 Encounters:   12/28/23 120/78               Passed - In person appointment or virtual visit in the past 12 mos or appointment in next 3 mos     Recent Outpatient Visits              5 months ago Osteoarthritis, unspecified osteoarthritis type, unspecified site    [facility]    [facility]    10 months ago Colon cancer screening    [facility] [place]    Nurse Only    11 months ago Inclusion cyst of vulva    [facility] Desiree Ruelas MD    Office Visit    11 months ago Encounter for gynecological examination without abnormal finding    [facility] Desiree Ruelas MD    Office Visit    1 year ago Depression with anxiety    [facility] [place]    Office Visit          Future Appointments         Provider Department Appt Notes    In 1 month Desiree Ruelas MD [facility] 03/20 1st mychart msg to reschedule-BA  px, last px 6/13/2023                    Passed - EGFRCR or GFRAA > 50     GFR Evaluation  EGFRCR: 71 , resulted on 6/13/2023               Future Appointments         Provider Department Appt Notes    In 1 month Desiree Ruelas MD [facility] 03/20 1st mychart msg to reschedule-BA  px, last px 6/13/2023            Recent Outpatient Visits              5 months ago  Osteoarthritis, unspecified osteoarthritis type, unspecified site    [facility]    Office Visit    10 months ago Colon cancer screening    [facility] [address] [place]    Nurse Only    11 months ago Inclusion cyst of vulva    [facility]    Office Visit    11 months ago Encounter for gynecological examination without abnormal finding    [facility]    Office Visit    1 year ago Depression with anxiety    [facility]    Office Visit

## 2024-05-20 NOTE — TELEPHONE ENCOUNTER
Please review.  Protocol failed / Has no protocol.     Requested Prescriptions   Pending Prescriptions Disp Refills    HYDROQUINONE 4 % External Cream [Pharmacy Med Name: HYDROQUINONE 4% CREAM] 28.35 g 1     Sig: APPLY TO AFFECTED AREA TWICE A DAY       There is no refill protocol information for this order        Future Appointments         Provider Department Appt Notes    In 1 month Desiree Ruelas MD [facility] 03/20 1st mychart msg to reschedule-BA  px, last px 6/13/2023          Recent Outpatient Visits              5 months ago Osteoarthritis, unspecified osteoarthritis type, unspecified site    [facility] Desiree Ruelas MD    Office Visit    10 months ago Colon cancer screening    [facility]    Nurse Only    11 months ago Inclusion cyst of vulva    [facility]    Office Visit    11 months ago Encounter for gynecological examination without abnormal finding    [facility] Desiree Ruelas MD    Office Visit    1 year ago Depression with anxiety    [facility] Desiree Ruelas MD    Office Visit

## 2024-05-21 RX ORDER — HYDROQUINONE 40 MG/G
1 CREAM TOPICAL 2 TIMES DAILY
Qty: 28.35 G | Refills: 1 | Status: SHIPPED | OUTPATIENT
Start: 2024-05-21

## 2024-07-03 RX ORDER — MELOXICAM 7.5 MG/1
TABLET ORAL
Qty: 180 TABLET | Refills: 0 | Status: SHIPPED | OUTPATIENT
Start: 2024-07-03

## 2024-07-03 NOTE — TELEPHONE ENCOUNTER
Refill passed per [facility] protocol.    Please review pended refill request as unable to refill due to high/very high interaction warning copied here:      High  Drug-Drug: escitalopram and MeloxicamToxic effects may be increased with concurrent administration of NSAIDs and Selective Serotonin Reuptake Inhibitors. The risk of upper gastrointestinal bleeding may be increased. Patients taking both drugs concurrently should be educated about the signs and symptoms of GI bleeding.  Details    Requested Prescriptions   Pending Prescriptions Disp Refills    MELOXICAM 7.5 MG Oral Tab [Pharmacy Med Name: MELOXICAM 7.5 MG TABLET] 180 tablet 1     Sig: TAKE 1 TO 2 TABLETS BY MOUTH DAILY       Non-Narcotic Pain Medication Protocol Passed - 6/29/2024  7:04 AM        Passed - In person appointment or virtual visit in the past 6 mos or appointment in next 3 mos     Recent Outpatient Visits              6 months ago Osteoarthritis, unspecified osteoarthritis type, unspecified site    [facility] Des[facility]as [facility]    Office Visit    11 months ago Colon cancer screening    [facility]    Nurse Only    1 year ago Inclusion cyst of vulva    [facility] Desiree Ruelas MD    Office Visit    1 year ago Encounter for gynecological examination without abnormal finding    [facility]    Office Visit    1 year ago Depression with anxiety    [facility] Desiree Ruelas MD    Office Visit          Future Appointments         Provider Department Appt Notes    In 1 month Desiree Ruelas MD [facility] px, last px 6/13/2023                       Recent Outpatient Visits              6 months ago Osteoarthritis, unspecified osteoarthritis type, unspecified site    [place]  [facility] Desiree Ruelas MD    Office Visit    11 months ago Colon cancer screening    [facility] [place] [facility]    Nurse Only    1 year ago Inclusion cyst of vulva    [facility] [place] Desiree Ruelas MD    Office Visit    1 year ago Encounter for gynecological examination without abnormal finding    [facility] Desiree Ruelas MD    Office Visit    1 year ago Depression with anxiety    [facility] Desiree Ruelas MD    Office Visit          Future Appointments         Provider Department Appt Notes    In 1 month Desiree Ruelas MD [facility] px, last px 6/13/2023

## 2024-07-08 RX ORDER — CARVEDILOL 6.25 MG/1
6.25 TABLET ORAL 2 TIMES DAILY WITH MEALS
Qty: 180 TABLET | Refills: 3 | Status: SHIPPED | OUTPATIENT
Start: 2024-07-08

## 2024-07-08 NOTE — TELEPHONE ENCOUNTER
Please review. Rx failed/no protocol.    No Active/ Future labs pended  Last CMP 12/8/21  Last BMP 9/2/22    Requested Prescriptions   Pending Prescriptions Disp Refills    CARVEDILOL 6.25 MG Oral Tab [Pharmacy Med Name: CARVEDILOL 6.25 MG TABLET] 180 tablet 1     Sig: TAKE 1 TABLET BY MOUTH TWICE A DAY WITH FOOD       Hypertension Medications Protocol Failed - 7/3/2024 12:09 AM        Failed - CMP or BMP in past 12 months        Failed - EGFRCR or GFRAA > 50     GFR Evaluation            Passed - Last BP reading less than 140/90     BP Readings from Last 1 Encounters:   12/28/23 120/78               Passed - In person appointment or virtual visit in the past 12 mos or appointment in next 3 mos     Recent Outpatient Visits              6 months ago Osteoarthritis, unspecified osteoarthritis type, unspecified site    [facility] Des[facility]e [facility]as [facility]    Office Visit    12 months ago [facility]    [facility]    [facility]    1 year ago Inclusion cyst of vulva    [facility] Des[facility]e Ruelas MD    Office Visit    1 year ago Encounter for gynecological examination without abnormal finding    [facility] Des[facility]e Ruelas MD    Office Visit    1 year ago Depression with anxiety    [facility] Des[facility]e Ruelas MD    Office Visit          Future Appointments         Provider Department Appt Notes    In 1 month Desiree Ruelas MD [facility] px, last px 6/13/2023                         Future Appointments         Provider Department Appt Notes    In 1 month Desiree Ruelas MD [facility] px, last px 6/13/2023          Recent Outpatient Visits              6 months ago Osteoarthritis, unspecified osteoarthritis type, unspecified site     [facility] [place]    Office Visit    12 months ago Colon cancer screening    [facility]    [facility]urse Only    1 year ago Inclusion cyst of vulva    [facility] [place] [facility] [facility] C, [facility]    Office Visit    1 year ago Encounter for gynecological examination without abnormal finding    [facility] [address] [place]    Office Visit    1 year ago Depression with anxiety    [facility] [place]    Office Visit

## 2024-07-29 DIAGNOSIS — H10.10 ALLERGIC CONJUNCTIVITIS, UNSPECIFIED LATERALITY: ICD-10-CM

## 2024-08-01 RX ORDER — FLUTICASONE PROPIONATE 50 MCG
2 SPRAY, SUSPENSION (ML) NASAL DAILY
Qty: 48 ML | Refills: 3 | Status: SHIPPED | OUTPATIENT
Start: 2024-08-01

## 2024-08-01 NOTE — TELEPHONE ENCOUNTER
REFILL PASSED PER [facility] PROTOCOLS    Requested Prescriptions   Pending Prescriptions Disp Refills    FLUTICASONE PROPIONATE 50 MCG/ACT Nasal Suspension [Pharmacy Med Name: FLUTICASONE PROP 50 MCG SPRAY] 48 mL 3     Sig: SPRAY 2 SPRAYS BY NASAL ROUTE DAILY       Allergy Medication Protocol Passed - 7/29/2024 12:37 PM        Passed - In person appointment or virtual visit in the past 12 mos or appointment in next 3 mos     Recent Outpatient Visits              7 months ago Osteoarthritis, unspecified osteoarthritis type, unspecified site    [facility] Des[facility]e Ruelas [facility]    Office Visit    1 year ago Colon cancer screening    [facility]    Nurse Only    1 year ago Inclusion cyst of vulva    [facility] Des[facility]e R[facility]    Office Visit    1 year ago Encounter for gynecological examination without abnormal finding    [facility] Desiree Ruelas MD    Office Visit    1 year ago Depression with anxiety    [facility] Desiree Ruelas MD    Office Visit          Future Appointments         Provider Department Appt Notes    In 1 week Desiree Ruelas MD [facility] face cyst like bumps  Policy advised    In 3 weeks Desiree Ruelas MD [facility] px, last px 6/13/2023                         Future Appointments         Provider Department Appt Notes    In 1 week Desiree Ruelas MD [facility] face cyst like bumps  Policy advised    In 3 weeks Desiree Ruelas MD [facility] px, last px 6/13/2023          Recent Outpatient Visits              7 months ago Osteoarthritis, unspecified osteoarthritis type, unspecified site    [facility] Mary C, MD    Office Visit    1 year ago Colon cancer screening    [facility]    Nurse Only    1 year ago Inclusion cyst of vulva    [facility]    Office Visit    1 year ago Encounter for gynecological examination without abnormal finding    [facility]    Office Visit    1 year ago Depression with anxiety    [facility]    Office Visit

## 2024-08-13 ENCOUNTER — OFFICE VISIT (OUTPATIENT)
Dept: FAMILY MEDICINE CLINIC | Facility: CLINIC | Age: 64
End: 2024-08-13

## 2024-08-13 VITALS
SYSTOLIC BLOOD PRESSURE: 119 MMHG | HEART RATE: 53 BPM | TEMPERATURE: 98 F | BODY MASS INDEX: 32.43 KG/M2 | OXYGEN SATURATION: 96 % | WEIGHT: 183 LBS | HEIGHT: 62.99 IN | DIASTOLIC BLOOD PRESSURE: 79 MMHG

## 2024-08-13 DIAGNOSIS — F33.1 MAJOR DEPRESSIVE DISORDER, RECURRENT, MODERATE (HCC): ICD-10-CM

## 2024-08-13 DIAGNOSIS — R22.32 SUBCUTANEOUS NODULE OF LEFT HAND: ICD-10-CM

## 2024-08-13 DIAGNOSIS — I10 ESSENTIAL HYPERTENSION: ICD-10-CM

## 2024-08-13 DIAGNOSIS — L72.0 INCLUSION CYST: Primary | ICD-10-CM

## 2024-08-13 PROCEDURE — 3008F BODY MASS INDEX DOCD: CPT | Performed by: FAMILY MEDICINE

## 2024-08-13 PROCEDURE — 3078F DIAST BP <80 MM HG: CPT | Performed by: FAMILY MEDICINE

## 2024-08-13 PROCEDURE — 99213 OFFICE O/P EST LOW 20 MIN: CPT | Performed by: FAMILY MEDICINE

## 2024-08-13 PROCEDURE — 3074F SYST BP LT 130 MM HG: CPT | Performed by: FAMILY MEDICINE

## 2024-08-13 NOTE — PROGRESS NOTES
Subjective:   Patient ID: Elham Ochoa is a 63 year old female.    Patient presents for skin issue, follow-up hypertension, discussed medications, depression, hand problem as below.        History/Other:   Review of Systems  Current Outpatient Medications   Medication Sig Dispense Refill    fluticasone propionate 50 MCG/ACT Nasal Suspension 2 sprays by Nasal route daily. 48 mL 3    carvedilol 6.25 MG Oral Tab Take 1 tablet (6.25 mg total) by mouth 2 (two) times daily with meals. 180 tablet 3    Meloxicam 7.5 MG Oral Tab TAKE 1 TO 2 TABLETS BY MOUTH DAILY 180 tablet 0    Hydroquinone 4 % External Cream Apply 1 Application topically 2 (two) times daily. APPLY TO AFFECTED AREA 28.35 g 1    Losartan Potassium-HCTZ 100-12.5 MG Oral Tab Take 1 tablet by mouth daily. 90 tablet 3    Multiple Vitamins-Minerals (EMERGEN-C IMMUNE OR) Take by mouth.      escitalopram 5 MG Oral Tab Take 1 tablet (5 mg total) by mouth daily. 90 tablet 3    valACYclovir 1 G Oral Tab Take 2 tablets (2,000 mg total) by mouth Q12H.      Psyllium (METAMUCIL) 28.3 % Oral Powder 1 tablespoon dissolved in large glass of water taken by mouth once a day 660 g 0    Misc Natural Products (AM/PM PERIMENOPAUSE FORMULA) Oral Tab As directed 90 tablet 0     Allergies:No Known Allergies    Objective:   Physical Exam  Constitutional:       Appearance: Normal appearance.   Cardiovascular:      Rate and Rhythm: Normal rate and regular rhythm.      Pulses: Normal pulses.      Heart sounds: Normal heart sounds.   Pulmonary:      Effort: Pulmonary effort is normal.      Breath sounds: Normal breath sounds.   Musculoskeletal:      Right lower leg: No edema.      Left lower leg: No edema.   Skin:     Comments: See below   Neurological:      Mental Status: She is alert.         Assessment & Plan:   1. Major depressive disorder, recurrent, moderate (HCC)-history of depression, Lexapro has worked well in the past.  Has had recent worsening with stress from the death of  niece with ovarian cancer and need to care for her son who had motorcycle accident.  Recommend restarting Lexapro which she had discontinued, had felt some morning fatigue.  Plan to change timing of dosing.  Follow-up at routine physical.   2. Inclusion cyst-several tiny inclusion cysts on face.  Recommend avoiding oil containing hair products, skin products.  Lesion on bridge of nose expressed today.   3. Subcutaneous nodule of left hand-subcutaneous nodule possible ganglion cyst but purpleish.  Will recheck at next visit if persistent consider referral to Dr. Morales.   4. Essential hypertension-she is only taking Coreg in the morning, recommend twice a day.  Continue losartan.       No orders of the defined types were placed in this encounter.      Meds This Visit:  Requested Prescriptions      No prescriptions requested or ordered in this encounter       Imaging & Referrals:  None

## 2024-08-22 ENCOUNTER — OFFICE VISIT (OUTPATIENT)
Dept: FAMILY MEDICINE CLINIC | Facility: CLINIC | Age: 64
End: 2024-08-22

## 2024-08-22 ENCOUNTER — LAB ENCOUNTER (OUTPATIENT)
Dept: LAB | Age: 64
End: 2024-08-22
Attending: FAMILY MEDICINE
Payer: COMMERCIAL

## 2024-08-22 VITALS
HEIGHT: 63 IN | OXYGEN SATURATION: 98 % | BODY MASS INDEX: 32.6 KG/M2 | DIASTOLIC BLOOD PRESSURE: 80 MMHG | HEART RATE: 57 BPM | SYSTOLIC BLOOD PRESSURE: 124 MMHG | WEIGHT: 184 LBS | TEMPERATURE: 98 F

## 2024-08-22 DIAGNOSIS — I10 ESSENTIAL HYPERTENSION: ICD-10-CM

## 2024-08-22 DIAGNOSIS — Z00.00 ROUTINE PHYSICAL EXAMINATION: ICD-10-CM

## 2024-08-22 DIAGNOSIS — D64.9 ANEMIA, UNSPECIFIED TYPE: ICD-10-CM

## 2024-08-22 DIAGNOSIS — Z00.00 ROUTINE PHYSICAL EXAMINATION: Primary | ICD-10-CM

## 2024-08-22 DIAGNOSIS — E66.09 CLASS 1 OBESITY DUE TO EXCESS CALORIES WITH SERIOUS COMORBIDITY AND BODY MASS INDEX (BMI) OF 32.0 TO 32.9 IN ADULT: ICD-10-CM

## 2024-08-22 DIAGNOSIS — Z12.31 VISIT FOR SCREENING MAMMOGRAM: ICD-10-CM

## 2024-08-22 DIAGNOSIS — F33.1 MAJOR DEPRESSIVE DISORDER, RECURRENT, MODERATE (HCC): ICD-10-CM

## 2024-08-22 LAB
ANION GAP SERPL CALC-SCNC: 5 MMOL/L (ref 0–18)
BUN BLD-MCNC: 15 MG/DL (ref 9–23)
BUN/CREAT SERPL: 14.7 (ref 10–20)
CALCIUM BLD-MCNC: 9.2 MG/DL (ref 8.7–10.4)
CHLORIDE SERPL-SCNC: 109 MMOL/L (ref 98–112)
CHOLEST SERPL-MCNC: 194 MG/DL (ref ?–200)
CO2 SERPL-SCNC: 30 MMOL/L (ref 21–32)
CREAT BLD-MCNC: 1.02 MG/DL
DEPRECATED RDW RBC AUTO: 42.7 FL (ref 35.1–46.3)
EGFRCR SERPLBLD CKD-EPI 2021: 62 ML/MIN/1.73M2 (ref 60–?)
ERYTHROCYTE [DISTWIDTH] IN BLOOD BY AUTOMATED COUNT: 13.2 % (ref 11–15)
FASTING PATIENT LIPID ANSWER: NO
FASTING STATUS PATIENT QL REPORTED: NO
GLUCOSE BLD-MCNC: 83 MG/DL (ref 70–99)
HCT VFR BLD AUTO: 34.4 %
HDLC SERPL-MCNC: 56 MG/DL (ref 40–59)
HGB BLD-MCNC: 11.4 G/DL
LDLC SERPL CALC-MCNC: 122 MG/DL (ref ?–100)
MCH RBC QN AUTO: 29.3 PG (ref 26–34)
MCHC RBC AUTO-ENTMCNC: 33.1 G/DL (ref 31–37)
MCV RBC AUTO: 88.4 FL
NONHDLC SERPL-MCNC: 138 MG/DL (ref ?–130)
OSMOLALITY SERPL CALC.SUM OF ELEC: 298 MOSM/KG (ref 275–295)
PLATELET # BLD AUTO: 325 10(3)UL (ref 150–450)
POTASSIUM SERPL-SCNC: 3.7 MMOL/L (ref 3.5–5.1)
RBC # BLD AUTO: 3.89 X10(6)UL
SODIUM SERPL-SCNC: 144 MMOL/L (ref 136–145)
TRIGL SERPL-MCNC: 86 MG/DL (ref 30–149)
VLDLC SERPL CALC-MCNC: 15 MG/DL (ref 0–30)
WBC # BLD AUTO: 7.8 X10(3) UL (ref 4–11)

## 2024-08-22 PROCEDURE — 36415 COLL VENOUS BLD VENIPUNCTURE: CPT

## 2024-08-22 PROCEDURE — 80061 LIPID PANEL: CPT

## 2024-08-22 PROCEDURE — 85027 COMPLETE CBC AUTOMATED: CPT

## 2024-08-22 PROCEDURE — 99396 PREV VISIT EST AGE 40-64: CPT | Performed by: FAMILY MEDICINE

## 2024-08-22 PROCEDURE — 80048 BASIC METABOLIC PNL TOTAL CA: CPT

## 2024-08-22 PROCEDURE — 3008F BODY MASS INDEX DOCD: CPT | Performed by: FAMILY MEDICINE

## 2024-08-22 PROCEDURE — 3074F SYST BP LT 130 MM HG: CPT | Performed by: FAMILY MEDICINE

## 2024-08-22 PROCEDURE — 3079F DIAST BP 80-89 MM HG: CPT | Performed by: FAMILY MEDICINE

## 2024-08-22 NOTE — PROGRESS NOTES
Subjective:   Patient ID: Elham Ochoa is a 63 year old female.    Patient presents for routine physical and issues as below.        History/Other:   Review of Systems   Constitutional: Negative.    Respiratory: Negative.     Cardiovascular: Negative.    Gastrointestinal: Negative.    Skin: Negative.    Neurological: Negative.    Psychiatric/Behavioral:  Positive for dysphoric mood. The patient is nervous/anxious.      Current Outpatient Medications   Medication Sig Dispense Refill    fluticasone propionate 50 MCG/ACT Nasal Suspension 2 sprays by Nasal route daily. 48 mL 3    carvedilol 6.25 MG Oral Tab Take 1 tablet (6.25 mg total) by mouth 2 (two) times daily with meals. 180 tablet 3    Meloxicam 7.5 MG Oral Tab TAKE 1 TO 2 TABLETS BY MOUTH DAILY 180 tablet 0    Hydroquinone 4 % External Cream Apply 1 Application topically 2 (two) times daily. APPLY TO AFFECTED AREA 28.35 g 1    Losartan Potassium-HCTZ 100-12.5 MG Oral Tab Take 1 tablet by mouth daily. 90 tablet 3    escitalopram 5 MG Oral Tab Take 1 tablet (5 mg total) by mouth daily. 90 tablet 3    valACYclovir 1 G Oral Tab Take 2 tablets (2,000 mg total) by mouth Q12H.      Misc Natural Products (AM/PM PERIMENOPAUSE FORMULA) Oral Tab As directed 90 tablet 0    Multiple Vitamins-Minerals (EMERGEN-C IMMUNE OR) Take by mouth.      Psyllium (METAMUCIL) 28.3 % Oral Powder 1 tablespoon dissolved in large glass of water taken by mouth once a day 660 g 0     Allergies:No Known Allergies    Objective:   Physical Exam  Constitutional:       Appearance: Normal appearance.   Cardiovascular:      Rate and Rhythm: Normal rate and regular rhythm.      Heart sounds: Normal heart sounds.   Pulmonary:      Effort: Pulmonary effort is normal.      Breath sounds: Normal breath sounds.   Chest:   Breasts:     Right: Normal. No mass.      Left: Normal. No mass.   Abdominal:      Palpations: Abdomen is soft. There is no mass.      Tenderness: There is no abdominal tenderness.    Lymphadenopathy:      Cervical: No cervical adenopathy.      Upper Body:      Right upper body: No axillary adenopathy.      Left upper body: No axillary adenopathy.   Skin:     General: Skin is warm and dry.   Neurological:      Mental Status: She is alert and oriented to person, place, and time.         Assessment & Plan:   1. Routine physical examination-patient is single, postmenopausal without spotting.  She is exercising with walking, has just started Elda again.  Muscle cramps better with magnesium.  Colon cancer screening up-to-date  Pap smear up-to-date  Check labs as ordered.   2. Visit for screening mammogram-order given   3. Essential hypertension-blood pressure controlled on current medication   4. Class 1 obesity due to excess calories with serious comorbidity and body mass index (BMI) of 32.0 to 32.9 in adult-strongly encouraged Jumpstart your health   5. Major depressive disorder, recurrent, moderate (HCC)-see previous visit.  Recent increase in symptoms with death of niece from ovarian cancer, and also caring for her son who recently had motorcycle accident.  She has been taking Lexapro 5 mg on an inconsistent basis.  Strongly recommend consistent dosing.  She will try to do this at about 7 PM at night as she has experienced some grogginess in the morning when taking at other times.       Orders Placed This Encounter   Procedures    Lipid Panel [E]    Basic Metabolic Panel (8) [E]    CBC, Platelet, No Differential [E]       Meds This Visit:  Requested Prescriptions      No prescriptions requested or ordered in this encounter       Imaging & Referrals:  [facility] REBEL 2D+3D SCREENING BILAT (CPT=77067/04769)  OP REFERRAL JUMP START YOUR HEALTH

## 2024-10-11 RX ORDER — MELOXICAM 7.5 MG/1
TABLET ORAL
Qty: 180 TABLET | Refills: 0 | Status: SHIPPED | OUTPATIENT
Start: 2024-10-11

## 2024-10-11 NOTE — TELEPHONE ENCOUNTER
Please review. Protocol Pass    Original rx written 180 with no refills.  Rx is pended, is refill appropriate?     Requested Prescriptions   Pending Prescriptions Disp Refills    MELOXICAM 7.5 MG Oral Tab [Pharmacy Med Name: MELOXICAM 7.5 MG TABLET] 180 tablet 0     Sig: TAKE 1 TO 2 TABLETS BY MOUTH DAILY       Non-Narcotic Pain Medication Protocol Passed - 10/11/2024  2:15 PM        Passed - In person appointment or virtual visit in the past 6 mos or appointment in next 3 mos     Recent Outpatient Visits              1 month ago Routine physical examination    [facility] Desiree Ruelas MD    Office Visit    1 month ago Inclusion cyst    [facility] Desiree Ruelas MD    Office Visit    10 months ago Osteoarthritis, unspecified osteoarthritis type, unspecified site    [facility] Desiree Ruelas MD    Office Visit    1 year ago Colon cancer screening    [facility]    Nurse Only    1 year ago Inclusion cyst of vulva    [facility] Desiree Ruelas MD    Office Visit          Future Appointments         Provider Department Appt Notes    In 2 weeks [address]                            Future Appointments         Provider Department Appt Notes    In 2 weeks [address]           Recent Outpatient Visits              1 month ago Routine physical examination    [facility] Desiree Ruelas MD    Office Visit    1 month ago Inclusion cyst    [facility] Desiree Ruelas MD    Office Visit    10 months ago Osteoarthritis, unspecified osteoarthritis type, unspecified site    [facility] Desiree Ruelas MD    Office Visit     1 year ago Colon cancer screening    [facility] [place]    Nurse Only    1 year ago Inclusion cyst of vulva    [facility] Desiree Ruelas MD    Office Visit

## 2024-10-30 ENCOUNTER — HOSPITAL ENCOUNTER (OUTPATIENT)
Dept: MAMMOGRAPHY | Age: 64
Discharge: HOME | End: 2024-10-30
Attending: FAMILY MEDICINE
Payer: COMMERCIAL

## 2024-10-30 DIAGNOSIS — Z12.31 VISIT FOR SCREENING MAMMOGRAM: ICD-10-CM

## 2024-10-30 PROCEDURE — 77067 SCR MAMMO BI INCL CAD: CPT | Performed by: FAMILY MEDICINE

## 2024-10-30 PROCEDURE — 77063 BREAST TOMOSYNTHESIS BI: CPT | Performed by: FAMILY MEDICINE

## 2024-12-12 ENCOUNTER — DOCUMENTATION ONLY (OUTPATIENT)
Dept: FAMILY MEDICINE CLINIC | Facility: CLINIC | Age: 64
End: 2024-12-12

## 2025-04-01 RX ORDER — LOSARTAN POTASSIUM AND HYDROCHLOROTHIAZIDE 12.5; 1 MG/1; MG/1
1 TABLET ORAL DAILY
Qty: 90 TABLET | Refills: 3 | Status: SHIPPED | OUTPATIENT
Start: 2025-04-01

## 2025-08-28 ENCOUNTER — LAB ENCOUNTER (OUTPATIENT)
Dept: LAB | Age: 65
End: 2025-08-28
Attending: FAMILY MEDICINE

## 2025-08-28 ENCOUNTER — OFFICE VISIT (OUTPATIENT)
Dept: FAMILY MEDICINE CLINIC | Facility: CLINIC | Age: 65
End: 2025-08-28

## 2025-08-28 VITALS
BODY MASS INDEX: 28.34 KG/M2 | SYSTOLIC BLOOD PRESSURE: 117 MMHG | HEIGHT: 62.21 IN | OXYGEN SATURATION: 96 % | HEART RATE: 56 BPM | WEIGHT: 156 LBS | DIASTOLIC BLOOD PRESSURE: 78 MMHG

## 2025-08-28 DIAGNOSIS — L72.0 INCLUSION CYST: ICD-10-CM

## 2025-08-28 DIAGNOSIS — Z12.31 ENCOUNTER FOR SCREENING MAMMOGRAM FOR BREAST CANCER: ICD-10-CM

## 2025-08-28 DIAGNOSIS — I10 HYPERTENSION, BENIGN: ICD-10-CM

## 2025-08-28 DIAGNOSIS — N95.8 GENITOURINARY SYNDROME OF MENOPAUSE: ICD-10-CM

## 2025-08-28 DIAGNOSIS — Z00.00 ROUTINE PHYSICAL EXAMINATION: ICD-10-CM

## 2025-08-28 DIAGNOSIS — Z00.00 ROUTINE PHYSICAL EXAMINATION: Primary | ICD-10-CM

## 2025-08-28 LAB
ALBUMIN SERPL-MCNC: 4.3 G/DL (ref 3.2–4.8)
ALBUMIN/GLOB SERPL: 1.5 (ref 1–2)
ALP LIVER SERPL-CCNC: 70 U/L (ref 50–130)
ALT SERPL-CCNC: 8 U/L (ref 10–49)
ANION GAP SERPL CALC-SCNC: 6 MMOL/L (ref 0–18)
AST SERPL-CCNC: 15 U/L (ref ?–34)
BILIRUB SERPL-MCNC: 0.5 MG/DL (ref 0.2–1.1)
BUN BLD-MCNC: 18 MG/DL (ref 9–23)
BUN/CREAT SERPL: 17.5 (ref 10–20)
CALCIUM BLD-MCNC: 9.1 MG/DL (ref 8.7–10.4)
CHLORIDE SERPL-SCNC: 104 MMOL/L (ref 98–112)
CHOLEST SERPL-MCNC: 192 MG/DL (ref ?–200)
CO2 SERPL-SCNC: 30 MMOL/L (ref 21–32)
CREAT BLD-MCNC: 1.03 MG/DL (ref 0.55–1.02)
DEPRECATED RDW RBC AUTO: 42.2 FL (ref 35.1–46.3)
EGFRCR SERPLBLD CKD-EPI 2021: 61 ML/MIN/1.73M2 (ref 60–?)
ERYTHROCYTE [DISTWIDTH] IN BLOOD BY AUTOMATED COUNT: 12.7 % (ref 11–15)
FASTING PATIENT LIPID ANSWER: YES
FASTING STATUS PATIENT QL REPORTED: YES
GLOBULIN PLAS-MCNC: 2.8 G/DL (ref 2–3.5)
GLUCOSE BLD-MCNC: 94 MG/DL (ref 70–99)
HCT VFR BLD AUTO: 35.9 % (ref 35–48)
HDLC SERPL-MCNC: 64 MG/DL (ref 40–59)
HGB BLD-MCNC: 11.5 G/DL (ref 12–16)
LDLC SERPL CALC-MCNC: 114 MG/DL (ref ?–100)
MCH RBC QN AUTO: 29.3 PG (ref 26–34)
MCHC RBC AUTO-ENTMCNC: 32 G/DL (ref 31–37)
MCV RBC AUTO: 91.6 FL (ref 80–100)
NONHDLC SERPL-MCNC: 128 MG/DL (ref ?–130)
OSMOLALITY SERPL CALC.SUM OF ELEC: 292 MOSM/KG (ref 275–295)
PLATELET # BLD AUTO: 328 10(3)UL (ref 150–450)
POTASSIUM SERPL-SCNC: 3.5 MMOL/L (ref 3.5–5.1)
PROT SERPL-MCNC: 7.1 G/DL (ref 5.7–8.2)
RBC # BLD AUTO: 3.92 X10(6)UL (ref 3.8–5.3)
SODIUM SERPL-SCNC: 140 MMOL/L (ref 136–145)
TRIGL SERPL-MCNC: 75 MG/DL (ref 30–149)
VLDLC SERPL CALC-MCNC: 13 MG/DL (ref 0–30)
WBC # BLD AUTO: 7 X10(3) UL (ref 4–11)

## 2025-08-28 PROCEDURE — 36415 COLL VENOUS BLD VENIPUNCTURE: CPT

## 2025-08-28 PROCEDURE — 85027 COMPLETE CBC AUTOMATED: CPT

## 2025-08-28 PROCEDURE — 80061 LIPID PANEL: CPT

## 2025-08-28 PROCEDURE — 80053 COMPREHEN METABOLIC PANEL: CPT

## 2025-08-28 RX ORDER — SENNOSIDES 8.6 MG
1300 CAPSULE ORAL 2 TIMES DAILY
COMMUNITY
Start: 2025-08-28

## 2025-08-28 RX ORDER — ESTRADIOL 0.1 MG/G
1 CREAM VAGINAL
Qty: 42 G | Refills: 3 | Status: SHIPPED | OUTPATIENT
Start: 2025-08-28

## 2025-08-28 RX ORDER — MELOXICAM 7.5 MG/1
TABLET ORAL
Qty: 180 TABLET | Refills: 0 | Status: CANCELLED | OUTPATIENT
Start: 2025-08-28

## (undated) NOTE — LETTER
AUTHORIZATION FOR SURGICAL OPERATION OR OTHER PROCEDURE    1.  I hereby [facility] Dr[facility] Roger Mohamud , [facility] assigned to my case to perform the following operation and/or procedure at the [facility]:    ___________________________ Patient Name:  ______________________________________________________  (please print)      Patient signature:  ___________________________________________________             Relationship to Patient:             Parent    Responsible person

## (undated) NOTE — LETTER
11/25/2017          To Whom It May Concern:    Cuco Calvo is currently under my medical care. She was off work 11/20-11/22/2017 due to medical issue. She may return to work 11/27/2017 without restrictions.       If you require additional information

## (undated) NOTE — LETTER
May 13, 2021    [address] [facility] [place], [address] [facility] [address]  Via Fax: [phone]    Re: Ariel Bridgette  : 1960      Dear Dr. Winston Ring:    I write to you in regards to my patient, Ariel Angeles, who is scheduled t

## (undated) NOTE — MR AVS SNAPSHOT
[facility]  [address], [address] [facility]  [phone]               Thank you for choosing us for your health care visit with Ira Lloyd.  Stella Smith MD.  We are glad to serve you and happy to provide you with this summary